# Patient Record
Sex: FEMALE | Race: ASIAN | NOT HISPANIC OR LATINO | Employment: FULL TIME | URBAN - METROPOLITAN AREA
[De-identification: names, ages, dates, MRNs, and addresses within clinical notes are randomized per-mention and may not be internally consistent; named-entity substitution may affect disease eponyms.]

---

## 2017-01-03 ENCOUNTER — COMMUNICATION - HEALTHEAST (OUTPATIENT)
Dept: FAMILY MEDICINE | Facility: CLINIC | Age: 27
End: 2017-01-03

## 2017-01-13 ENCOUNTER — COMMUNICATION - HEALTHEAST (OUTPATIENT)
Dept: FAMILY MEDICINE | Facility: CLINIC | Age: 27
End: 2017-01-13

## 2017-01-30 ENCOUNTER — COMMUNICATION - HEALTHEAST (OUTPATIENT)
Dept: FAMILY MEDICINE | Facility: CLINIC | Age: 27
End: 2017-01-30

## 2017-06-07 ENCOUNTER — OFFICE VISIT - HEALTHEAST (OUTPATIENT)
Dept: FAMILY MEDICINE | Facility: CLINIC | Age: 27
End: 2017-06-07

## 2017-06-07 DIAGNOSIS — J02.0 STREP PHARYNGITIS: ICD-10-CM

## 2017-06-07 DIAGNOSIS — J02.9 PHARYNGITIS: ICD-10-CM

## 2017-06-07 DIAGNOSIS — R05.9 COUGH: ICD-10-CM

## 2017-12-10 ENCOUNTER — COMMUNICATION - HEALTHEAST (OUTPATIENT)
Dept: FAMILY MEDICINE | Facility: CLINIC | Age: 27
End: 2017-12-10

## 2017-12-10 DIAGNOSIS — Z30.9 CONTRACEPTION MANAGEMENT: ICD-10-CM

## 2018-03-04 ENCOUNTER — COMMUNICATION - HEALTHEAST (OUTPATIENT)
Dept: FAMILY MEDICINE | Facility: CLINIC | Age: 28
End: 2018-03-04

## 2018-03-04 DIAGNOSIS — Z30.9 CONTRACEPTION MANAGEMENT: ICD-10-CM

## 2018-05-27 ENCOUNTER — COMMUNICATION - HEALTHEAST (OUTPATIENT)
Dept: FAMILY MEDICINE | Facility: CLINIC | Age: 28
End: 2018-05-27

## 2018-05-27 DIAGNOSIS — Z30.9 CONTRACEPTION MANAGEMENT: ICD-10-CM

## 2018-06-11 ENCOUNTER — OFFICE VISIT - HEALTHEAST (OUTPATIENT)
Dept: FAMILY MEDICINE | Facility: CLINIC | Age: 28
End: 2018-06-11

## 2018-06-11 DIAGNOSIS — Z30.41 ENCOUNTER FOR SURVEILLANCE OF CONTRACEPTIVE PILLS: ICD-10-CM

## 2018-06-11 DIAGNOSIS — Z12.4 SCREENING FOR CERVICAL CANCER: ICD-10-CM

## 2018-06-11 DIAGNOSIS — Z00.00 ROUTINE GENERAL MEDICAL EXAMINATION AT A HEALTH CARE FACILITY: ICD-10-CM

## 2018-06-11 ASSESSMENT — MIFFLIN-ST. JEOR: SCORE: 1220.2

## 2018-08-19 ENCOUNTER — COMMUNICATION - HEALTHEAST (OUTPATIENT)
Dept: FAMILY MEDICINE | Facility: CLINIC | Age: 28
End: 2018-08-19

## 2018-08-19 DIAGNOSIS — Z30.9 CONTRACEPTION MANAGEMENT: ICD-10-CM

## 2019-01-22 ENCOUNTER — COMMUNICATION - HEALTHEAST (OUTPATIENT)
Dept: SCHEDULING | Facility: CLINIC | Age: 29
End: 2019-01-22

## 2019-01-23 ENCOUNTER — OFFICE VISIT - HEALTHEAST (OUTPATIENT)
Dept: FAMILY MEDICINE | Facility: CLINIC | Age: 29
End: 2019-01-23

## 2019-01-23 DIAGNOSIS — L29.2 VULVAR ITCHING: ICD-10-CM

## 2019-01-23 LAB
CLUE CELLS: NORMAL
TRICHOMONAS, WET PREP: NORMAL
YEAST, WET PREP: NORMAL

## 2019-01-23 ASSESSMENT — MIFFLIN-ST. JEOR: SCORE: 1224.62

## 2019-01-26 ENCOUNTER — COMMUNICATION - HEALTHEAST (OUTPATIENT)
Dept: SCHEDULING | Facility: CLINIC | Age: 29
End: 2019-01-26

## 2019-01-28 ENCOUNTER — OFFICE VISIT - HEALTHEAST (OUTPATIENT)
Dept: FAMILY MEDICINE | Facility: CLINIC | Age: 29
End: 2019-01-28

## 2019-01-28 DIAGNOSIS — Z86.39 HISTORY OF THYROID DISEASE: ICD-10-CM

## 2019-01-28 DIAGNOSIS — R21 RASH AND NONSPECIFIC SKIN ERUPTION: ICD-10-CM

## 2019-01-28 LAB
ANION GAP SERPL CALCULATED.3IONS-SCNC: 9 MMOL/L (ref 5–18)
BUN SERPL-MCNC: 9 MG/DL (ref 8–22)
C REACTIVE PROTEIN LHE: 0.6 MG/DL (ref 0–0.8)
CALCIUM SERPL-MCNC: 9.7 MG/DL (ref 8.5–10.5)
CHLORIDE BLD-SCNC: 106 MMOL/L (ref 98–107)
CO2 SERPL-SCNC: 25 MMOL/L (ref 22–31)
CREAT SERPL-MCNC: 0.76 MG/DL (ref 0.6–1.1)
ERYTHROCYTE [DISTWIDTH] IN BLOOD BY AUTOMATED COUNT: 11.9 % (ref 11–14.5)
ERYTHROCYTE [SEDIMENTATION RATE] IN BLOOD BY WESTERGREN METHOD: 13 MM/HR (ref 0–20)
GFR SERPL CREATININE-BSD FRML MDRD: >60 ML/MIN/1.73M2
GLUCOSE BLD-MCNC: 83 MG/DL (ref 70–125)
HCT VFR BLD AUTO: 38.6 % (ref 35–47)
HGB BLD-MCNC: 12.4 G/DL (ref 12–16)
MCH RBC QN AUTO: 27.3 PG (ref 27–34)
MCHC RBC AUTO-ENTMCNC: 32.2 G/DL (ref 32–36)
MCV RBC AUTO: 85 FL (ref 80–100)
PLATELET # BLD AUTO: 261 THOU/UL (ref 140–440)
PMV BLD AUTO: 7.7 FL (ref 7–10)
POTASSIUM BLD-SCNC: 4 MMOL/L (ref 3.5–5)
RBC # BLD AUTO: 4.55 MILL/UL (ref 3.8–5.4)
SODIUM SERPL-SCNC: 140 MMOL/L (ref 136–145)
TSH SERPL DL<=0.005 MIU/L-ACNC: 1.64 UIU/ML (ref 0.3–5)
WBC: 5.4 THOU/UL (ref 4–11)

## 2019-01-28 ASSESSMENT — MIFFLIN-ST. JEOR: SCORE: 1208.29

## 2019-05-01 ENCOUNTER — COMMUNICATION - HEALTHEAST (OUTPATIENT)
Dept: FAMILY MEDICINE | Facility: CLINIC | Age: 29
End: 2019-05-01

## 2019-05-01 DIAGNOSIS — Z30.9 CONTRACEPTION MANAGEMENT: ICD-10-CM

## 2020-01-07 ENCOUNTER — COMMUNICATION - HEALTHEAST (OUTPATIENT)
Dept: FAMILY MEDICINE | Facility: CLINIC | Age: 30
End: 2020-01-07

## 2020-01-07 DIAGNOSIS — Z30.9 CONTRACEPTION MANAGEMENT: ICD-10-CM

## 2020-02-12 ENCOUNTER — OFFICE VISIT - HEALTHEAST (OUTPATIENT)
Dept: FAMILY MEDICINE | Facility: CLINIC | Age: 30
End: 2020-02-12

## 2020-02-12 DIAGNOSIS — Z30.41 ENCOUNTER FOR SURVEILLANCE OF CONTRACEPTIVE PILLS: ICD-10-CM

## 2020-02-12 DIAGNOSIS — Z00.00 ROUTINE GENERAL MEDICAL EXAMINATION AT A HEALTH CARE FACILITY: ICD-10-CM

## 2020-02-12 ASSESSMENT — MIFFLIN-ST. JEOR: SCORE: 1223.94

## 2020-03-25 ENCOUNTER — COMMUNICATION - HEALTHEAST (OUTPATIENT)
Dept: FAMILY MEDICINE | Facility: CLINIC | Age: 30
End: 2020-03-25

## 2020-03-25 DIAGNOSIS — Z30.9 CONTRACEPTION MANAGEMENT: ICD-10-CM

## 2021-05-20 ENCOUNTER — RECORDS - HEALTHEAST (OUTPATIENT)
Dept: FAMILY MEDICINE | Facility: CLINIC | Age: 31
End: 2021-05-20

## 2021-05-24 ENCOUNTER — RECORDS - HEALTHEAST (OUTPATIENT)
Dept: FAMILY MEDICINE | Facility: CLINIC | Age: 31
End: 2021-05-24

## 2021-05-27 ENCOUNTER — OFFICE VISIT - HEALTHEAST (OUTPATIENT)
Dept: FAMILY MEDICINE | Facility: CLINIC | Age: 31
End: 2021-05-27

## 2021-05-27 DIAGNOSIS — Z00.00 ROUTINE GENERAL MEDICAL EXAMINATION AT A HEALTH CARE FACILITY: ICD-10-CM

## 2021-05-27 DIAGNOSIS — Z12.4 SCREENING FOR MALIGNANT NEOPLASM OF CERVIX: ICD-10-CM

## 2021-05-27 DIAGNOSIS — Z30.41 ENCOUNTER FOR SURVEILLANCE OF CONTRACEPTIVE PILLS: ICD-10-CM

## 2021-05-27 ASSESSMENT — MIFFLIN-ST. JEOR: SCORE: 1214.18

## 2021-05-28 NOTE — TELEPHONE ENCOUNTER
Refill Approved    Rx renewed per Medication Renewal Policy. Medication was last renewed on 8/20/18.    Lilian Bach, [facility] Triage/Med Refill 5/1/2019     Requested Prescriptions   Pending Prescriptions Disp Refills     norgestimate-ethinyl estradiol (ORTHO TRI-CYCLEN) 0.18/0.215/0.25 mg-35 mcg (28) Tab tablet [Pharmacy Med Name: [facility] PK 28] 84 tablet 2     Sig: TAKE 1 TABLET DAILY (DUE FOR PHYSICAL)       Oral Contraceptives Protocol Passed - 5/1/2019  1:22 AM        Passed - Visit with PCP or prescribing provider visit in last 12 months      Last office visit with prescriber/PCP: 6/11/2018 Michelle Moctezuma MD OR same dept: 1/28/2019 Angelica Garcia CNP OR same specialty: 1/28/2019 Angelica Garcia CNP  Last physical: Visit date not found Last MTM visit: Visit date not found   Next visit within 3 mo: Visit date not found  Next physical within 3 mo: Visit date not found  Prescriber OR PCP: Michelle Moctezuma MD  Last diagnosis associated with med order: 1. Contraception management  - norgestimate-ethinyl estradiol (ORTHO TRI-CYCLEN) 0.18/0.215/0.25 mg-35 mcg (28) Tab tablet [Pharmacy Med Name: [facility] TR[facility]HAS[facility] PK 28]; TAKE 1 TABLET DAILY (DUE FOR PHYSICAL)  Dispense: 84 tablet; Refill: 2    If protocol passes may refill for 12 months if within 3 months of last provider visit (or a total of 15 months).

## 2021-05-31 VITALS — WEIGHT: 116.4 LBS | BODY MASS INDEX: 21.12 KG/M2

## 2021-06-01 VITALS — WEIGHT: 117.5 LBS | BODY MASS INDEX: 21.62 KG/M2 | HEIGHT: 62 IN

## 2021-06-02 VITALS — WEIGHT: 117.6 LBS | HEIGHT: 63 IN | BODY MASS INDEX: 20.84 KG/M2

## 2021-06-02 VITALS — WEIGHT: 114 LBS | BODY MASS INDEX: 20.2 KG/M2 | HEIGHT: 63 IN

## 2021-06-04 VITALS
BODY MASS INDEX: 21.68 KG/M2 | RESPIRATION RATE: 16 BRPM | HEART RATE: 72 BPM | DIASTOLIC BLOOD PRESSURE: 66 MMHG | SYSTOLIC BLOOD PRESSURE: 120 MMHG | HEIGHT: 62 IN | WEIGHT: 117.8 LBS

## 2021-06-05 NOTE — TELEPHONE ENCOUNTER
Left detailed message to call back and schedule a Px or medication check     Upon call back please help pt schedule a Px with MJ for refills of her OBC

## 2021-06-05 NOTE — TELEPHONE ENCOUNTER
Due to be seen    Rx renewed per Medication Renewal Policy. Medication was last renewed on 5/1/19.    Lilian Bach, [facility] Triage/Med Refill 1/8/2020     Requested Prescriptions   Pending Prescriptions Disp Refills     norgestimate-ethinyl estradiol (ORTHO TRI-CYCLEN) 0.18/0.215/0.25 mg-35 mcg (28) tablet [Pharmacy Med Name: [facility] TR[facility]HAS[facility] PK 28] 84 tablet 4     Sig: TAKE 1 TABLET DAILY (DUE FOR PHYSICAL)       Oral Contraceptives Protocol Passed - 1/7/2020 11:23 PM        Passed - Visit with PCP or prescribing provider visit in last 12 months      Last office visit with prescriber/PCP: 6/11/2018 Michelle Moctezuma MD OR same dept: 1/28/2019 Angelica Garcia CNP OR same specialty: 1/28/2019 Angelica Garcia CNP  Last physical: Visit date not found Last MTM visit: Visit date not found   Next visit within 3 mo: Visit date not found  Next physical within 3 mo: Visit date not found  Prescriber OR PCP: Michelle Moctezuma MD  Last diagnosis associated with med order: 1. Contraception management  - norgestimate-ethinyl estradiol (ORTHO TRI-CYCLEN) 0.18/0.215/0.25 mg-35 mcg (28) tablet [Pharmacy Med Name: [facility] PK 28]; TAKE 1 TABLET DAILY (DUE FOR PHYSICAL)  Dispense: 84 tablet; Refill: 4    If protocol passes may refill for 12 months if within 3 months of last provider visit (or a total of 15 months).

## 2021-06-05 NOTE — TELEPHONE ENCOUNTER
Left message to call back for: Pt to call back on mainline to schedule an appointment    Information to relay to patient:  Pt is due for a PHY or an OV to F/U on medication.  Please help the Pt schedule an appointment when she calls back

## 2021-06-06 NOTE — PROGRESS NOTES
Assessment/Plan:     1. Routine general medical examination at a health care facility  Routine history and physical, updated in EMR.  Fasting labs deferred for future visit.  Immunizations updated today.  Pap smear is up-to-date.  Plan repeat physical in 1 year.    2. Encounter for surveillance of contraceptive pills  Patient reports she is doing well on her current OCP and desires refills.  These will be refilled for 1 year when she needs them.      Subjective:      Rubi Mehta is a 29 y.o. female who presents for an annual exam. The patient is sexually active. The patient participates in regular exercise: yes. The patient reports that there is not domestic violence in her life.  Patient has been feeling well and has no particular questions or concerns today.  She does wish to continue her current OCP prescription.    Healthy Habits:   Regular Exercise: Yes  Sunscreen Use: Yes  Healthy Diet: Yes  Dental Visits Regularly: Yes  Seat Belt: Yes  Sexually active: Yes  Self Breast Exam Monthly:No  Lipid Profile: No  Glucose Screen: No      Immunization History   Administered Date(s) Administered     HPV Quadrivalent 02/06/2008, 06/18/2008, 07/09/2010     Hep A, historic 07/09/2007, 04/25/2016     Influenza, inj, historic,unspecified 12/19/2006, 09/27/2011, 10/17/2018     Tdap 10/03/2012     Immunization status: missing doses of flu, will do today.    No exam data present    Gynecologic History  Patient's last menstrual period was 02/08/2020.  Contraception: OCP (estrogen/progesterone)  Last Pap: 6/11/18. Results were: normal      OB History   No obstetric history on file.       Current Outpatient Medications   Medication Sig Dispense Refill     norgestimate-ethinyl estradiol (ORTHO TRI-CYCLEN) 0.18/0.215/0.25 mg-35 mcg (28) tablet TAKE 1 TABLET DAILY (DUE FOR PHYSICAL) 84 tablet 0     predniSONE (DELTASONE) 20 MG tablet Take 20 mg by mouth daily. 7 tablet 0     No current facility-administered medications for this  visit.      Past Medical History:   Diagnosis Date     Hyperthyroidism     in [place] treated with PTU for 4 years     Past Surgical History:   Procedure Laterality Date     WISDOM TOOTH EXTRACTION       Propylthiouracil  Family History   Problem Relation Age of Onset     Hypertension Mother      Colon cancer Father 75     Hypertension Maternal Aunt      Hypertension Maternal Uncle      Diabetes Maternal Grandmother      Hypertension Maternal Grandmother      Breast cancer Neg Hx      Cancer Neg Hx      Heart disease Neg Hx      Social History     Socioeconomic History     Marital status: Single     Spouse name: Not on file     Number of children: Not on file     Years of education: Not on file     Highest education level: Not on file   Occupational History     Not on file   Social Needs     Financial resource strain: Not on file     Food insecurity:     Worry: Not on file     Inability: Not on file     Transportation needs:     Medical: Not on file     Non-medical: Not on file   Tobacco Use     Smoking status: Never Smoker     Smokeless tobacco: Never Used   Substance and Sexual Activity     Alcohol use: Yes     Alcohol/week: 0.0 - 1.0 standard drinks     Drug use: Not on file     Sexual activity: Yes     Partners: Male     Birth control/protection: OCP     Comment:  Armando   Lifestyle     Physical activity:     Days per week: Not on file     Minutes per session: Not on file     Stress: Not on file   Relationships     Social connections:     Talks on phone: Not on file     Gets together: Not on file     Attends [employer] service: Not on file     Active member of club or organization: Not on file     Attends meetings of clubs or organizations: Not on file     Relationship status: Not on file     Intimate partner violence:     Fear of current or ex partner: Not on file     Emotionally abused: Not on file     Physically abused: Not on file     Forced sexual activity: Not on file   Other Topics Concern     Not on  "file   Social History Narrative     Not on file       Review of Systems  General:  Denies problem  Eyes: Denies problem  Ears/Nose/Throat: Denies problem  Cardiovascular: Denies problem  Respiratory:  Denies problem  Gastrointestinal:  Denies problem  Genitourinary: Denies problem  Musculoskeletal:  Denies problem  Skin: Denies problem  Neurologic: Denies problem  Psychiatric: Denies problem  Endocrine: Denies problem  Heme/Lymphatic: Denies problem   Allergic/Immunologic: Denies problem        Objective:         Vitals:    02/12/20 1424   BP: 120/66   Pulse: 72   Resp: 16   Weight: 117 lb 12.8 oz (53.4 kg)   Height: 5' 2.4\" (1.585 m)     Body mass index is 21.27 kg/m .    Physical Exam:  General Appearance: Alert, cooperative, no distress, appears stated age  Head: Normocephalic, without obvious abnormality, atraumatic  Eyes: PERRL, conjunctiva/corneas clear, EOM's intact  Ears: Normal TM's and external ear canals, both ears  Nose: Nares normal, septum midline, mucosa normal, no drainage  Throat: Lips, mucosa, and tongue normal; teeth and gums normal  Neck: Supple, symmetrical, trachea midline, no adenopathy; thyroid: not enlarged, symmetric, no tenderness/mass/nodules  Back: Symmetric, no curvature, ROM normal, no CVA tenderness  Lungs: Clear to auscultation bilaterally, respirations unlabored  Breasts: No breast masses, tenderness, asymmetry, or nipple discharge  Heart: Regular rate and rhythm, S1 and S2 normal, no murmur, rub, or gallop  Abdomen: Soft, non-tender, bowel sounds active all four quadrants, no masses, no organomegaly  Pelvic:Not examined  Extremities: Extremities normal, atraumatic, no cyanosis or edema  Skin: Skin color, texture, turgor normal, no rashes or lesions  Lymph nodes: Cervical, supraclavicular, and axillary nodes normal  Neurologic: Normal        "

## 2021-06-11 NOTE — PROGRESS NOTES
Chief Complaint   Patient presents with     Sore Throat     x 1 finished medication today had strep,penicillin 500 mg x2 for seven days for strep , still has a sore throat     Cough     x2 days ago , cough stuffy nose         History of Present Illness: Nursing notes reviewed. Patient was treated at her workplace clinic for strep throat, taking her last dose of PCN yesterday. Her cough is keeping her awake, and is new the past 5 days. She feels like she has something stuck her her throat when she swallows. She has a prior history of hyperthyroidism, with her heart rate being over 100 often when ill she states. No shortness of breath at exam. She has a history of childhood asthma. She was agreeable to me prescribing 3 more days of PCN, which I recommended to her. Her job involves working in a group environment.     Review of systems: See history of present illness, otherwise negative.     Current Outpatient Prescriptions   Medication Sig Dispense Refill     norgestimate-ethinyl estradiol (ORTHO TRI-CYCLEN) 0.18/0.215/0.25 mg-35 mcg (28) Tab tablet Take 1 tablet by mouth daily. 84 tablet 3     albuterol (PROAIR HFA;PROVENTIL HFA;VENTOLIN HFA) 90 mcg/actuation inhaler Inhale 2 puffs every 4 (four) hours as needed for wheezing or shortness of breath (cough). 1 Inhaler 0     benzonatate (TESSALON) 200 MG capsule Take 1 capsule (200 mg total) by mouth 3 (three) times a day as needed for cough. 20 capsule 0     etonogestrel-ethinyl estradiol (NUVARING) 0.12-0.015 mg/24 hr vaginal ring Insert 1 each into the vagina every 21 days. 1 each 11     penicillin VK (PEN VK) 500 MG tablet Take 1 tablet (500 mg total) by mouth 2 (two) times a day for 3 days. 6 tablet 0     No current facility-administered medications for this visit.        Past Medical History:   Diagnosis Date     Hyperthyroidism     in [place] treated with PTU for 4 years      Past Surgical History:   Procedure Laterality Date     WISDOM TOOTH EXTRACTION         Social History     Social History     Marital status: Single     Spouse name: N/A     Number of children: N/A     Years of education: N/A     Social History Main Topics     Smoking status: Never Smoker     Smokeless tobacco: None     Alcohol use 0.0 - 0.6 oz/week     0 - 1 Shots of liquor per week     Drug use: None     Sexual activity: Yes     Partners: Male     Birth control/ protection: OCP     Other Topics Concern     None     Social History Narrative       History   Smoking Status     Never Smoker   Smokeless Tobacco     Not on file      Exam:   Blood pressure 114/74, pulse (!) 105, temperature 99.3  F (37.4  C), temperature source Oral, resp. rate 16, weight 116 lb 6.4 oz (52.8 kg), last menstrual period 06/07/2017, SpO2 98 %, not currently breastfeeding.    EXAM:   General: Vital signs reviewed. Patient is in no acute appearing distress except for occasional cough, with a normal, pleasant affect. Breathing is non labored appearing. Patient is alert and oriented x 3.   ENT: Ear exam shows clear TMs with no injection, nasal turbinates are injected and edematous with increased clear rhinorrhea, mild pharyngeal injection noted. No aphthous ulcers, vesicles, or scabs noted on inner or outer oral exam.  Neck: supple with mildly tender adenopathy.  Heart: Normal rate and rhythm without murmur  Lungs: Clear to auscultation with good air flow bilaterally.  Skin: warm and dry  Recent Results (from the past 24 hour(s))   Mononucleosis Screen   Result Value Ref Range    Mono Screen Negative Negative   HM1 (CBC with Diff)   Result Value Ref Range    WBC 9.1 4.0 - 11.0 thou/uL    RBC 4.72 3.80 - 5.40 mill/uL    Hemoglobin 13.1 12.0 - 16.0 g/dL    Hematocrit 38.6 35.0 - 47.0 %    MCV 82 80 - 100 fL    MCH 27.6 27.0 - 34.0 pg    MCHC 33.8 32.0 - 36.0 g/dL    RDW 12.2 11.0 - 14.5 %    Platelets 370 140 - 440 thou/uL    MPV 6.7 (L) 7.0 - 10.0 fL    Neutrophils % 64 50 - 70 %    Lymphocytes % 27 20 - 40 %    Monocytes % 6 2 -  10 %    Eosinophils % 2 0 - 6 %    Basophils % 1 0 - 2 %    Neutrophils Absolute 5.9 2.0 - 7.7 thou/uL    Lymphocytes Absolute 2.4 0.8 - 4.4 thou/uL    Monocytes Absolute 0.6 0.0 - 0.9 thou/uL    Eosinophils Absolute 0.2 0.0 - 0.4 thou/uL    Basophils Absolute 0.1 0.0 - 0.2 thou/uL    Results from exam reviewed with patient.  I advised patient that treatment with albuterol may help relieve some of her coughing. I advised her that should could be experiencing an overlapping bacterial strep, and viral respiratory illness.  Assessment/Plan   1. Pharyngitis  HM1(CBC and Differential)    Mononucleosis Screen    HM1 (CBC with Diff)   2. Cough  HM1(CBC and Differential)    HM1 (CBC with Diff)    benzonatate (TESSALON) 200 MG capsule    albuterol (PROAIR HFA;PROVENTIL HFA;VENTOLIN HFA) 90 mcg/actuation inhaler   3. Strep pharyngitis         There are no Patient Instructions on file for this visit.   John Krause DO

## 2021-06-16 PROBLEM — Z30.41 ENCOUNTER FOR SURVEILLANCE OF CONTRACEPTIVE PILLS: Status: ACTIVE | Noted: 2018-06-17

## 2021-06-17 NOTE — TELEPHONE ENCOUNTER
RN cannot approve Refill Request    RN can NOT refill this medication PCP messaged that patient is overdue for Office Visit. Last office visit: 6/11/2018 Michelle Moctezuma MD Last Physical: 2/12/2020 Last MTM visit: Visit date not found Last visit same specialty: 1/28/2019 Angelica Garcia CNP.  Next visit within 3 mo: Visit date not found  Next physical within 3 mo: Visit date not found      Noris Alvarado, [facility] Triage/Med Refill 5/20/2021    Requested Prescriptions   Pending Prescriptions Disp Refills     norgestimate-ethinyl estradioL (ORTHO TRI-CYCLEN) 0.18/0.215/0.25 mg-35 mcg (28) tablet [Pharmacy Med Name: [facility] PK 28] 84 tablet 3     Sig: TAKE 1 TABLET DAILY       Oral Contraceptives Protocol Failed - 5/20/2021 12:08 AM        Failed - Visit with PCP or prescribing provider visit in last 12 months      Last office visit with prescriber/PCP: 6/11/2018 Michelle Moctezuma MD OR same dept: Visit date not found OR same specialty: 1/28/2019 Angelica Garcia CNP  Last physical: 2/12/2020 Last MTM visit: Visit date not found   Next visit within 3 mo: Visit date not found  Next physical within 3 mo: Visit date not found  Prescriber OR PCP: Michelle Moctezuma MD  Last diagnosis associated with med order: 1. Contraception management  - norgestimate-ethinyl estradioL (ORTHO TRI-CYCLEN) 0.18/0.215/0.25 mg-35 mcg (28) tablet [Pharmacy Med Name: [facility] PK 28]; TAKE 1 TABLET DAILY  Dispense: 84 tablet; Refill: 3    If protocol passes may refill for 12 months if within 3 months of last provider visit (or a total of 15 months).

## 2021-06-18 NOTE — PROGRESS NOTES
Assessment/Plan:     1. Routine general medical examination at a health care facility  Routine history and physical, updated in EMR.  Fasting labs deferred for a future visit.  Pap smear updated today.  Plan repeat physical in 1 year.    2. Encounter for surveillance of contraceptive pills  Doing well on current OCP.  Does not desire any changes.  Refills provided for 1 year.    3. Screening for cervical cancer  - Gynecologic Cytology (PAP Smear)      Subjective:      Rubi Mehta is a 27 y.o. female who presents for an annual exam. The patient is sexually active. The patient participates in regular exercise: yes. The patient reports that there is not domestic violence in her life.  Patient has been feeling well and has no particular questions or concerns today.  She would like a refill of her OCP.    Healthy Habits:   Regular Exercise: Yes  Sunscreen Use: Yes  Healthy Diet: Yes  Dental Visits Regularly: Yes  Seat Belt: Yes  Sexually active: Yes  Self Breast Exam Monthly:No  Lipid Profile: No  Glucose Screen: No  Guns at Home:  No      Immunization History   Administered Date(s) Administered     HPV Quadrivalent 02/06/2008, 06/18/2008, 07/09/2010     Hep A, historic 07/09/2007, 04/25/2016     Influenza, inj, historic,unspecified 12/19/2006, 09/27/2011     Tdap 10/03/2012     Immunization status: up to date and documented.    No exam data present    Gynecologic History  Patient's last menstrual period was 06/03/2018.  Contraception: OCP (estrogen/progesterone)  Last Pap: 5124-4650, unsure where. Results were: normal      OB History   No data available       Current Outpatient Prescriptions   Medication Sig Dispense Refill     norgestimate-ethinyl estradiol (ORTHO TRI-CYCLEN) 0.18/0.215/0.25 mg-35 mcg (28) Tab tablet TAKE 1 TABLET DAILY (DUE FOR PHYSICAL) 84 tablet 0     No current facility-administered medications for this visit.      Past Medical History:   Diagnosis Date     Hyperthyroidism     in [place] treated  "with PTU for 4 years     Past Surgical History:   Procedure Laterality Date     WISDOM TOOTH EXTRACTION       Propylthiouracil  Family History   Problem Relation Age of Onset     Hypertension Mother      Colon cancer Father 75     Hypertension Maternal Aunt      Hypertension Maternal Uncle      Diabetes Maternal Grandmother      Hypertension Maternal Grandmother      Breast cancer Neg Hx      Cancer Neg Hx      Heart disease Neg Hx      Social History     Social History     Marital status: Single     Spouse name: N/A     Number of children: N/A     Years of education: N/A     Occupational History     Not on file.     Social History Main Topics     Smoking status: Never Smoker     Smokeless tobacco: Never Used     Alcohol use 0.0 - 0.6 oz/week     0 - 1 Shots of liquor per week     Drug use: Not on file     Sexual activity: Yes     Partners: Male     Birth control/ protection: OCP     Other Topics Concern     Not on file     Social History Narrative       Review of Systems  General:  Denies problem  Eyes: Denies problem  Ears/Nose/Throat: Denies problem  Cardiovascular: Denies problem  Respiratory:  Denies problem  Gastrointestinal:  Denies problem  Genitourinary: Denies problem  Musculoskeletal:  Denies problem  Skin: Denies problem  Neurologic: Denies problem  Psychiatric: Denies problem  Endocrine: Denies problem  Heme/Lymphatic: Denies problem   Allergic/Immunologic: Denies problem        Objective:         Vitals:    06/11/18 1600   BP: 120/64   Pulse: 68   Resp: 16   Weight: 117 lb 8 oz (53.3 kg)   Height: 5' 2.25\" (1.581 m)     Body mass index is 21.32 kg/(m^2).    Physical Exam:  General Appearance: Alert, cooperative, no distress, appears stated age  Head: Normocephalic, without obvious abnormality, atraumatic  Eyes: PERRL, conjunctiva/corneas clear, EOM's intact  Ears: Normal TM's and external ear canals, both ears  Nose: Nares normal, septum midline, mucosa normal, no drainage  Throat: Lips, mucosa, and " tongue normal; teeth and gums normal  Neck: Supple, symmetrical, trachea midline, no adenopathy; thyroid: not enlarged, symmetric, no tenderness/mass/nodules  Back: Symmetric, no curvature, ROM normal, no CVA tenderness  Lungs: Clear to auscultation bilaterally, respirations unlabored  Breasts: No breast masses, tenderness, asymmetry, or nipple discharge  Heart: Regular rate and rhythm, S1 and S2 normal, no murmur, rub, or gallop  Abdomen: Soft, non-tender, bowel sounds active all four quadrants, no masses, no organomegaly  Pelvic: Normally developed genitalia with no external lesions or eruptions. Vagina and cervix show no lesions, inflammation, discharge or tenderness. No cystocele, No rectocele. Uterus normal to palpation.  No adnexal mass or tenderness.  Extremities: Extremities normal, atraumatic, no cyanosis or edema  Skin: Skin color, texture, turgor normal, no rashes or lesions  Lymph nodes: Cervical, supraclavicular, and axillary nodes normal  Neurologic: Normal

## 2021-06-23 NOTE — PROGRESS NOTES
Assessment/Plan:       1. Rash and nonspecific skin eruption  Unclear etiology of the rash.  She does have a history of hyperthyroidism I will check a thyroid cascade today.  In addition we will check her kidney function CBC and inflammatory markers.  I advised patient to let me know if symptoms are not improving.  In addition I did prescribe some prednisone today for her to take to help reduce the symptoms.  I encouraged her to continue utilizing the Benadryl and she may also find benefit from an H2 blocker such as Allegra, Claritin, or Zyrtec.  Recommend following up if rash is worsening or changing.  He is in agreement this plan.  - predniSONE (DELTASONE) 20 MG tablet; Take 20 mg by mouth daily.  Dispense: 7 tablet; Refill: 0  - Thyroid [place]  - Basic Metabolic Panel  - HM2(CBC w/o Differential)  - C-Reactive Protein(CRP)  - Sedimentation Rate    2. History of thyroid disease  We will check for abnormalities of her thyroid today given her history of parathyroidism.  She has had this treated in high school and has not required further treatments.  - Thyroid Cascade        Subjective:      Rubi Mehta is a 28 y.o. female who presents for concerns of rash on arms, legs, and abdomen that is itchy. This started 4 days ago. No known new exposure to soaps, lotion, laundry detergent, change in environment, or animals.   She has applied hydrocortisone without help. Benedryl does seem to help but only lasts about 4 hours. She initially thought that this was dry skin and had applied lotion, it has not improved. She was seen last week for external perineal itching. Wet prep was negative. She had an itchy Vulva. No other internal symptoms.  She is otherwise feeling well.  She denies any chest pain, shortness of breath, or difficulty breathing.  She has not had any nausea or vomiting.  She denies any upper respiratory tract infections.  She denies any history of similar rash or symptoms.    The following portions of  "the patient's history were reviewed and updated as appropriate: allergies, current medications and problem list.    Review of Systems   Pertinent items are noted in HPI.      Objective:      /78   Pulse 68   Resp 16   Ht 5' 2.5\" (1.588 m)   Wt 114 lb (51.7 kg)   LMP 01/14/2019 (Exact Date)   BMI 20.52 kg/m      General appearance: alert, appears stated age and cooperative  Head: Normocephalic, without obvious abnormality, atraumatic  Lungs: clear to auscultation bilaterally  Heart: regular rate and rhythm, S1, S2 normal, no murmur, click, rub or gallop  Extremities: extremities normal, atraumatic, no cyanosis or edema  Pulses: 2+ and symmetric  Neurologic: Grossly normal   Skin: Maculopapular scattered rash present on upper and lower extremities the rash is small papular that is scattered.  It appears that the rash appears after she itches.  Rash is similar to a drug type reaction rash versus a viral exanthem.  "

## 2021-06-23 NOTE — TELEPHONE ENCOUNTER
"Caller states she has itchy skin around\" vagina and around the urethra and in the folds\". No discharge noted. This began 3 days ago. She has changed the soap she uses, and it didn't help.     Reason for Disposition    Mild vulvar itching    Protocols used: VULVAR SYMPTOMS-A-AH    Triaged to a disposition of Home care, see provider if sx do not respond to home care within 3 days.  Caller transferred to  per her request.     Nathalie Orozco RN [facility] Triage Nurse  "

## 2021-06-23 NOTE — TELEPHONE ENCOUNTER
"Patient called and states she was seen Wednesday for vaginal itching  -Wet prep was neg    However now her whole body is itching, arms and legs are worse. Initially there was no rash, but since scratching now is developing red bumps.     Feels like a \"normal itch that won't go away, but it is everywhere\". Keeps her up at night. Discomfort rated at 8/10    Has been applying lotion, taking benadryl     Nothing (lotion, soap, food, etc) has changed    No fever  No sores or scratch marks  No difficulty breathing or swallowing  Not pregnant  LMP 1/14/19    Rash:   Small red dots everywhere she has itched.   Do not janae  Not blistered  No stiff neck    Reason for Disposition    [1] MODERATE-SEVERE widespread itching (i.e., interferes with sleep, normal activities or school) AND [2] not improved after 24 hours of itching Care Advice    [1] Itching of unknown cause AND [2] present < 48 hours    [1] Purple or blood-colored rash (spots or dots) AND [2] no fever AND [3] sounds well to triager    Protocols used: ITCHING - WIDESPREAD-A-AH, RASH OR REDNESS - WIDESPREAD-A-AH    Triaged to a disposition of See a physician within 4 hours. Patient states she will wait until tomorrow to be seen at [facility]. Care advice given    Dina Aldridge RN [facility]    "

## 2021-06-23 NOTE — PROGRESS NOTES
"Assessment / Impression     1. Vulvar itching  Wet Prep, Vaginal       Plan:     1.  Reviewed with patient her normal wet prep results.  We discussed common causes of vulvar itching.  She is going to remove all possible offending agents.  If she has persistent symptoms she will try over-the-counter Monistat externally only.  We also discussed a short course of over-the-counter hydrocortisone for symptomatic relief.  If she has persistent symptoms despite these measures encouraged return for evaluation in the next 2 weeks.    Subjective:      HPI: Rubi Mehta is a 28 y.o. female with vulvar itching is here today for evaluation.  Patient reports proximate 3 days ago she experienced some itching on the external vaginal area.  She describes an itching sensation throughout all of the vaginal folds.  She denies any symptoms internally.  She reports she might notice a slight increase in vaginal discharge.  She reports she has had no new exposures to new soaps or detergents.  She is never had anything like this before.  She is monogamous with one sexual partner and this is not a new partner.  She denies any other allergies or skin conditions.  She denies any vaginal pain or abnormal bleeding.  She is on the birth control pill for contraception and had a normal last menstrual period on January 14.  She has not tried anything for symptoms.  She reports her symptoms are worse in the morning and at night.  She has no other questions or concerns.      Review of Systems  Pertinent items are noted in HPI.       Objective:     /78 (Patient Site: Left Arm, Patient Position: Sitting, Cuff Size: Adult Regular)   Pulse 84   Ht 5' 2.5\" (1.588 m)   Wt 117 lb 9.6 oz (53.3 kg)   LMP 01/14/2019 (Exact Date)   Breastfeeding? No   BMI 21.17 kg/m    Physical Examination: General appearance - alert, well appearing, and in no distress  : Normal external genitalia.  No erythema or evidence of excoriation.  No lesions or rashes " noted.  Wet prep analysis obtained from vaginal mucosa just inside the introitus.  Wet prep is negative/normal.

## 2021-06-25 NOTE — PROGRESS NOTES
Assessment/Plan:     1. Routine general medical examination at a health care facility  Routine history and physical, updated in EMR.  Fasting labs deferred for future visit.  Pap smear updated today.  Immunizations are up-to-date.  Plan repeat physical in 1 year.    2. Encounter for surveillance of contraceptive pills  Patient continues to enjoy this method of contraception.  Refills provided today.  - norgestimate-ethinyl estradioL (ORTHO TRI-CYCLEN) 0.18/0.215/0.25 mg-35 mcg (28) tablet; Take 1 tablet by mouth daily.  Dispense: 84 tablet; Refill: 3    3. Screening for malignant neoplasm of cervix  Routine screening Pap smear updated today.  - Gynecologic Cytology (PAP Smear)      Subjective:      Rubi Mehta is a 30 y.o. female who presents for an annual exam. The patient is sexually active. The patient participates in regular exercise: yes. The patient reports that there is not domestic violence in her life.    1. Seasonal allergies.  Occasionally takes a nondrowsy antihistamine.  Discussed trial of nasal spray.  2. Has very few moles on her skin.   has many moles, has had several biopsies.  Patient is wondering if she should be seeing dermatology yearly.  Discussed that she is welcome to check with her insurance.  No concerning pigmented lesions on limited exam today.  Discussed that likely she does not need a referral to see dermatology, if she does, she can let me know.    Healthy Habits:   Regular Exercise: Yes  Sunscreen Use: Yes  Healthy Diet: Yes  Dental Visits Regularly: Yes  Seat Belt: Yes  Sexually active: Yes  Self Breast Exam Monthly:No  Lipid Profile: No  Glucose Screen: No      Immunization History   Administered Date(s) Administered     Covid-19,PF,Cherelle 03/09/2021     HPV Quadrivalent 02/06/2008, 06/18/2008, 07/09/2010     Hep A, historic 07/09/2007, 04/25/2016     INFLUENZA,SEASONAL QUAD, PF, =/> 6months 02/12/2020     Influenza, inj, historic,unspecified 12/19/2006, 09/27/2011,  10/17/2018     Tdap 10/03/2012     Immunization status: up to date and documented.    No exam data present    Gynecologic History  Patient's last menstrual period was 05/06/2021.  Contraception: OCP (estrogen/progesterone)  Last Pap: 6/11/18. Results were: normal      OB History   No obstetric history on file.       Current Outpatient Medications   Medication Sig Dispense Refill     norgestimate-ethinyl estradioL (ORTHO TRI-CYCLEN) 0.18/0.215/0.25 mg-35 mcg (28) tablet Take 1 tablet by mouth daily. 84 tablet 3     No current facility-administered medications for this visit.      Past Medical History:   Diagnosis Date     [place]     [place] treated with PTU for 4 years     Past Surgical History:   Procedure Laterality Date     WISDOM TOOTH EXTRACTION       Propylthiouracil  Family History   Problem Relation Age of Onset     Hypertension Mother      Colon cancer Father 75     Hypertension Maternal Aunt      Hypertension Maternal Uncle      Diabetes Maternal Grandmother      Hypertension Maternal Grandmother      Breast cancer Neg Hx      Cancer Neg Hx      Heart disease Neg Hx      Social History     Socioeconomic History     Marital status: Single     Spouse name: Not on file     Number of children: Not on file     Years of education: Not on file     Highest education level: Not on file   Occupational History     Not on file   Social Needs     Financial resource strain: Not on file     Food insecurity     Worry: Not on file     Inability: Not on file     Transportation needs     Medical: Not on file     Non-medical: Not on file   Tobacco Use     Smoking status: Never Smoker     Smokeless tobacco: Never Used   Substance and Sexual Activity     Alcohol use: Yes     Alcohol/week: 0.0 - 1.0 standard drinks     Drug use: Never     Sexual activity: Yes     Partners: Male     Birth control/protection: OCP     Comment:  Armando   Lifestyle     Physical activity     Days per week: Not on file     Minutes per  "session: Not on file     Stress: Not on file   Relationships     Social connections     Talks on phone: Not on file     Gets together: Not on file     Attends Evangelical service: Not on file     Active member of club or organization: Not on file     Attends meetings of clubs or organizations: Not on file     Relationship status: Not on file     Intimate partner violence     Fear of current or ex partner: Not on file     Emotionally abused: Not on file     Physically abused: Not on file     Forced sexual activity: Not on file   Other Topics Concern     Not on file   Social History Narrative     Not on file       Review of Systems  General:  Denies problem  Eyes: Denies problem  Ears/Nose/Throat: Denies problem  Cardiovascular: Denies problem  Respiratory:  Denies problem  Gastrointestinal:  Denies problem  Genitourinary: Denies problem  Musculoskeletal:  Denies problem  Skin: Denies problem  Neurologic: Denies problem  Psychiatric: Denies problem  Endocrine: Denies problem  Heme/Lymphatic: Denies problem   Allergic/Immunologic: Seasonal allergies      Objective:         Vitals:    05/27/21 1447   BP: 108/71   Pulse: 75   Weight: 116 lb 6.4 oz (52.8 kg)   Height: 5' 2.5\" (1.588 m)     Body mass index is 20.95 kg/m .    Physical Exam:  General Appearance: Alert, cooperative, no distress, appears stated age  Head: Normocephalic, without obvious abnormality, atraumatic  Eyes: PERRL, conjunctiva/corneas clear, EOM's intact  Ears: Normal TM's and external ear canals, both ears  Nose: Nares normal, septum midline, mucosa normal, no drainage  Throat: Lips, mucosa, and tongue normal; teeth and gums normal  Neck: Supple, symmetrical, trachea midline, no adenopathy; thyroid: not enlarged, symmetric, no tenderness/mass/nodules  Back: Symmetric, no curvature, ROM normal, no CVA tenderness  Lungs: Clear to auscultation bilaterally, respirations unlabored  Breasts: No breast masses, tenderness, asymmetry, or nipple discharge  Heart: " Regular rate and rhythm, S1 and S2 normal, no murmur, rub, or gallop  Abdomen: Soft, non-tender, bowel sounds active all four quadrants, no masses, no organomegaly  Pelvic:Normally developed genitalia with no external lesions or eruptions. Vagina and cervix show no lesions, inflammation, discharge or tenderness. No cystocele, No rectocele. Uterus normal to palpation.  No adnexal mass or tenderness.  Extremities: Extremities normal, atraumatic, no cyanosis or edema  Skin: Skin color, texture, turgor normal, no rashes or lesions  Lymph nodes: Cervical, supraclavicular, and axillary nodes normal  Neurologic: Normal

## 2021-07-06 VITALS
SYSTOLIC BLOOD PRESSURE: 108 MMHG | WEIGHT: 116.4 LBS | HEIGHT: 63 IN | DIASTOLIC BLOOD PRESSURE: 71 MMHG | BODY MASS INDEX: 20.62 KG/M2 | HEART RATE: 75 BPM

## 2021-09-05 NOTE — TELEPHONE ENCOUNTER
Patient was seen yesterday 05.27.21 with Dr Moctezuma for her physical.    Attending Attestation (For Attendings USE Only)...

## 2021-10-02 ENCOUNTER — HEALTH MAINTENANCE LETTER (OUTPATIENT)
Age: 31
End: 2021-10-02

## 2021-10-19 ENCOUNTER — OFFICE VISIT (OUTPATIENT)
Dept: FAMILY MEDICINE | Facility: CLINIC | Age: 31
End: 2021-10-19
Payer: COMMERCIAL

## 2021-10-19 VITALS
DIASTOLIC BLOOD PRESSURE: 84 MMHG | OXYGEN SATURATION: 98 % | RESPIRATION RATE: 16 BRPM | HEART RATE: 82 BPM | SYSTOLIC BLOOD PRESSURE: 123 MMHG

## 2021-10-19 DIAGNOSIS — M54.50 ACUTE BILATERAL LOW BACK PAIN WITHOUT SCIATICA: Primary | ICD-10-CM

## 2021-10-19 PROCEDURE — 99213 OFFICE O/P EST LOW 20 MIN: CPT | Performed by: PHYSICIAN ASSISTANT

## 2021-10-19 RX ORDER — CYCLOBENZAPRINE HCL 5 MG
5 TABLET ORAL
Qty: 30 TABLET | Refills: 0 | Status: SHIPPED | OUTPATIENT
Start: 2021-10-19 | End: 2021-10-26

## 2021-10-19 RX ORDER — NORGESTIMATE AND ETHINYL ESTRADIOL 7DAYSX3 28
1 KIT ORAL
COMMUNITY
Start: 2021-05-27 | End: 2022-04-24

## 2021-10-19 NOTE — PROGRESS NOTES
URGENT CARE VISIT:    SUBJECTIVE:   Rubi Mehta is a 31 year old female who presents for evaluation of back pain  Symptoms began 2 day(s) ago, have been onset acute and are worse.  Pain is located in the low back bilateral region, without radiation. Pain is exacerbated by: bending.  Pain is relieved by: standing. Associated symptoms include: none. Denies any fever, unintentional weight loss,bladder urgency, bladder incontinence and bowel incontinence. Recent injury: she sneezed and felt immediate back pain.  Personal hx of back pain is no prior back problems.     PMH:   Past Medical History:   Diagnosis Date     Hyperthyroidism      [place]thy[place]     [place] treated with PTU for 4 years     Allergies: Other [no clinical screening - see comments]  Medications:   Current Outpatient Medications   Medication Sig Dispense Refill     cyclobenzaprine (FLEXERIL) 5 MG tablet Take 1 tablet (5 mg) by mouth at bedtime as needed, may repeat once for muscle spasms 30 tablet 0     norgestim-eth estrad triphasic (ORTHO TRI-CYCLEN) 0.18/0.215/0.25 MG-35 MCG tablet Take 1 tablet by mouth       norelgestromin-ethinyl estradiol (ORTHO EVRA) 150-20 MCG/24HR patch Place 1 patch onto the skin once a week. (Patient not taking: Reported on 10/19/2021)       Social History:   Social History     Tobacco Use     Smoking status: Never Smoker     Smokeless tobacco: Never Used   Substance Use Topics     Alcohol use: Yes     Alcohol/week: 0.0 - 1.0 standard drinks     Comment: almost never       ROS: ROS otherwise found to be negative except as noted above.     OBJECTIVE:  /84   Pulse 82   Resp 16   SpO2 98%   General: WDWN in NAD.   Cardiac: RRR without murmurs, rubs, or gallops.  Respiratory: LCTAB without adventitious sounds. Non-labored breathing.  Musculoskeletal: Ambulating without difficulty. Back symmetric, no curvature. ROM normal. Pain when she returns to straight from forward flexion. No CVA tenderness.. Tender to  palpation over paralumbar spine. Straight leg raise test: negative  Neurological: Normal strength and tone with no weakness or sensory deficit noted, reflexes normal.     ASSESSMENT:    ICD-10-CM    1. Acute bilateral low back pain without sciatica  M54.50 cyclobenzaprine (FLEXERIL) 5 MG tablet         PLAN:  Patient Instructions     Patient was educated on the natural course of injury which usually resolves within a few weeks.  Conservative measures discussed including rest, ice for 48 hours then heat after, exercises, and over-the-counter analgesics (Tylenol or Ibuprofen). See your primary care provider if symptoms worsen or do not improve in 7 days.  Seek emergency care if you develop severe pain, weakness, or changes in bowel/bladder habits.     Patient verbalized understanding and is agreeable to plan. The patient was discharged ambulatory and in stable condition.    Jina Vale PA-C ....................  10/19/2021   10:46 AM

## 2021-10-19 NOTE — PATIENT INSTRUCTIONS
Patient was educated on the natural course of injury which usually resolves within a few weeks.  Conservative measures discussed including rest, ice for 48 hours then heat after, exercises, and over-the-counter analgesics (Tylenol or Ibuprofen). See your primary care provider if symptoms worsen or do not improve in 7 days.  Seek emergency care if you develop severe pain, weakness, or changes in bowel/bladder habits.       Self-Care for Back Pain    Principles to encourage healing of musculoskeletal back pain:  1) Reduce Strain  2) Practice Relaxation  3) Good Posture  4) Progressive Stretching  5) Get a good night's sleep    Avoid muscle tensing habits and activities that put strain on the back.  Remind yourself regularly to determine if you are doing any tensing habits. Use reminders methods such as stickers or timers.  If noticed, replace these negative habits with a positive habit of having your head up, chest up, chin in, and shoulders down and back.       Back tensing    Sleeping on your stomach    Tensing or twist the back often    Sitting for hours at a time most days    Lifting objects by bending at the waist   Slouching while standing or walking    Slouching forward while sitting in a chair    Carrying heavy objects while bent at waist     Twisting the back to one side when working     Avoid tensing and poor posture while driving     Avoid events or activities that trigger the pain.  Use a pain diary to review daily activities that aggravate the pain and change your behavior.    Practice relaxation and deep breathing  Lay down on your back and take a deep breath from your abdomen up. Let it out slowly as you allow your back, neck and shoulders to relax.  This can calm you, reduce stress, and decrease muscle tensing.     Good Posture  Sitting:  Keep your butt to the back of the chair and sit with your shoulders and head up. This will help reduce strain to the back muscles.  Closely monitor your back position  (under your shoulders) to maintain balanced and relaxed head, neck, shoulder and back muscles.  Support your mid back with a rolled towel and lean back so that your the shoulders fall back, your neck is relaxed, and your head is up.    Lifting:  Use the squat lift for heavy objects. Start with the object between your legs.  Squat down, keep your head up, shoulders back, and spine erect.  Bend at the hips, knees, and ankles as you lift.   Use the golfer's lift for light objects by using some support (a chair, a desk, or a putter).  Put your hand on the support to take the load off your back as you bend over.      Progressive Stretching.  Stretch the back and hip muscles and joints.  One option would be to use sun salutation yoga exercises.   Touch the toes, reach above, one leg out, other leg, butt up, butt down, other leg up, and stretch.  This routine will stretch tight paraspinous, hamstrings, hip, and quadratus lumborum muscles.  Gently stretch 4 rotations for 2 times per day to loosen up tight backs.     Get a good night s sleep with a good semi-firm mattress.  Avoid caffeinated beverages (coffee, tea, and soft drinks) later in the day.  Improve your sleep environment.  Reduce light and noise and lie on a comfortable mattress.  Reduce stimulating activities in the late evening including computer work and exercising.  Avoid sleeping on your stomach.     Apply heat or cold and massage tender muscles.   Heat or ice applications used up to four times per day can relax the muscles and reduce pain. For heat, microwave a wet towel for approximately 1 minute or until towel is warm and wrap around a hot-water bottle or heated gel pack and apply for 15 to 20 minutes. For cold, use ice wrapped in a thin cloth on the area until you first feel some numbness. Use what feels best. Heat is often used for more chronic pain conditions and cold for acute conditions. Massage tender points in the muscles    Use anti-inflammatory and  pain-reducing medications.  Short-term and occasional use of over-the-counter ibuprofen, naproxen, acetaminophen, or aspirin (without caffeine) can reduce joint and muscle pain.  Be sure to check with your provider to be sure this would be safe.  Please note that extended daily use can sometimes cause rebound pain and an extend your symptoms.

## 2022-04-21 DIAGNOSIS — Z30.41 ENCOUNTER FOR SURVEILLANCE OF CONTRACEPTIVE PILLS: Primary | ICD-10-CM

## 2022-04-24 RX ORDER — NORGESTIMATE AND ETHINYL ESTRADIOL 7DAYSX3 28
KIT ORAL
Qty: 84 TABLET | Refills: 0 | Status: SHIPPED | OUTPATIENT
Start: 2022-04-24 | End: 2022-07-14

## 2022-04-24 NOTE — TELEPHONE ENCOUNTER
"Outpatient Medication Detail     Disp Refills Start End YOUSIF   norgestimate-ethinyl estradioL (ORTHO TRI-CYCLEN) 0.18/0.215/0.25 mg-35 mcg (28) tablet 84 tablet 3 5/27/2021  No   Sig - Route: Take 1 tablet by mouth daily. - Oral   Sent to pharmacy as: norgestimate-ethinyl estradioL 0.18 mg/0.215mg/0.25mg-35 mcg(28)tablet (ORTHO TRI-CYCLEN)   E-Prescribing Status: Receipt confirmed by pharmacy (5/27/2021  2:55 PM CDT)     Last office visit provider:  5/27/21     Requested Prescriptions   Pending Prescriptions Disp Refills     norgestim-eth estrad triphasic (ORTHO TRI-CYCLEN) 0.18/0.215/0.25 MG-35 MCG tablet [Pharmacy Med Name: [facility]GE[facility]/E ES TRIPHASIC PK 28 35MCG] 84 tablet 3     Sig: TAKE 1 TABLET DAILY       Contraceptives Protocol Passed - 4/24/2022  9:17 AM        Passed - Patient is not a current smoker if age is 35 or older        Passed - Recent (12 mo) or future (30 days) visit within the authorizing provider's specialty     Patient has had an office visit with the authorizing provider or a provider within the authorizing providers department within the previous 12 mos or has a future within next 30 days. See \"Patient Info\" tab in inbasket, or \"Choose Columns\" in Meds & Orders section of the refill encounter.              Passed - Medication is active on med list        Passed - No active pregnancy on record        Passed - No positive pregnancy test in past 12 months             Manny Vázquez RN 04/24/22 9:17 AM  "

## 2022-07-03 ENCOUNTER — HEALTH MAINTENANCE LETTER (OUTPATIENT)
Age: 32
End: 2022-07-03

## 2022-07-14 DIAGNOSIS — Z30.41 ENCOUNTER FOR SURVEILLANCE OF CONTRACEPTIVE PILLS: ICD-10-CM

## 2022-07-14 RX ORDER — NORGESTIMATE AND ETHINYL ESTRADIOL 7DAYSX3 28
KIT ORAL
Qty: 28 TABLET | Refills: 0 | Status: SHIPPED | OUTPATIENT
Start: 2022-07-14 | End: 2022-08-11

## 2022-07-14 NOTE — TELEPHONE ENCOUNTER
"Last Written Prescription Date:  4/24/22  Last Fill Quantity: 84,  # refills: 0   Last office visit provider:  5/27/21     Requested Prescriptions   Pending Prescriptions Disp Refills     norgestim-eth estrad triphasic (ORTHO TRI-CYCLEN) 0.18/0.215/0.25 MG-35 MCG tablet [Pharmacy Med Name: [facility]/E ES TRIPHASIC PK 28 35MCG] 84 tablet 3     Sig: TAKE 1 TABLET DAILY       Contraceptives Protocol Passed - 7/14/2022 12:06 AM        Passed - Patient is not a current smoker if age is 35 or older        Passed - Recent (12 mo) or future (30 days) visit within the authorizing provider's specialty     Patient has had an office visit with the authorizing provider or a provider within the authorizing providers department within the previous 12 mos or has a future within next 30 days. See \"Patient Info\" tab in inbasket, or \"Choose Columns\" in Meds & Orders section of the refill encounter.              Passed - Medication is active on med list        Passed - No active pregnancy on record        Passed - No positive pregnancy test in past 12 months             Lilian Bach, RN 07/14/22 12:15 PM  "

## 2022-08-11 ENCOUNTER — OFFICE VISIT (OUTPATIENT)
Dept: FAMILY MEDICINE | Facility: CLINIC | Age: 32
End: 2022-08-11
Payer: COMMERCIAL

## 2022-08-11 VITALS
BODY MASS INDEX: 22.86 KG/M2 | HEART RATE: 74 BPM | HEIGHT: 62 IN | WEIGHT: 124.2 LBS | SYSTOLIC BLOOD PRESSURE: 113 MMHG | RESPIRATION RATE: 16 BRPM | DIASTOLIC BLOOD PRESSURE: 74 MMHG

## 2022-08-11 DIAGNOSIS — Z11.59 NEED FOR HEPATITIS C SCREENING TEST: ICD-10-CM

## 2022-08-11 DIAGNOSIS — Z30.41 ENCOUNTER FOR SURVEILLANCE OF CONTRACEPTIVE PILLS: ICD-10-CM

## 2022-08-11 DIAGNOSIS — Z00.00 ROUTINE GENERAL MEDICAL EXAMINATION AT A HEALTH CARE FACILITY: Primary | ICD-10-CM

## 2022-08-11 DIAGNOSIS — Z13.220 LIPID SCREENING: ICD-10-CM

## 2022-08-11 DIAGNOSIS — Z86.39 HISTORY OF GRAVES' DISEASE: ICD-10-CM

## 2022-08-11 DIAGNOSIS — Z11.4 SCREENING FOR HIV (HUMAN IMMUNODEFICIENCY VIRUS): ICD-10-CM

## 2022-08-11 LAB
CHOLEST SERPL-MCNC: 151 MG/DL
HDLC SERPL-MCNC: 62 MG/DL
LDLC SERPL CALC-MCNC: 51 MG/DL
NONHDLC SERPL-MCNC: 89 MG/DL
TRIGL SERPL-MCNC: 190 MG/DL
TSH SERPL DL<=0.005 MIU/L-ACNC: 2.07 UIU/ML (ref 0.3–4.2)

## 2022-08-11 PROCEDURE — 80061 LIPID PANEL: CPT | Performed by: FAMILY MEDICINE

## 2022-08-11 PROCEDURE — 36415 COLL VENOUS BLD VENIPUNCTURE: CPT | Performed by: FAMILY MEDICINE

## 2022-08-11 PROCEDURE — 87389 HIV-1 AG W/HIV-1&-2 AB AG IA: CPT | Performed by: FAMILY MEDICINE

## 2022-08-11 PROCEDURE — 84443 ASSAY THYROID STIM HORMONE: CPT | Performed by: FAMILY MEDICINE

## 2022-08-11 PROCEDURE — 86803 HEPATITIS C AB TEST: CPT | Performed by: FAMILY MEDICINE

## 2022-08-11 PROCEDURE — 99395 PREV VISIT EST AGE 18-39: CPT | Performed by: FAMILY MEDICINE

## 2022-08-11 RX ORDER — NORGESTIMATE AND ETHINYL ESTRADIOL 7DAYSX3 28
1 KIT ORAL DAILY
Qty: 84 TABLET | Refills: 3 | Status: SHIPPED | OUTPATIENT
Start: 2022-08-11 | End: 2023-02-07

## 2022-08-11 ASSESSMENT — ENCOUNTER SYMPTOMS
DIZZINESS: 0
BREAST MASS: 0
CONSTIPATION: 0
HEMATURIA: 0
PARESTHESIAS: 0
JOINT SWELLING: 0
ABDOMINAL PAIN: 0
CHILLS: 0
HEMATOCHEZIA: 0
HEADACHES: 0
SORE THROAT: 0
DIARRHEA: 0
FREQUENCY: 0
HEARTBURN: 0
NERVOUS/ANXIOUS: 0
ARTHRALGIAS: 0
SHORTNESS OF BREATH: 0
COUGH: 0
WEAKNESS: 0
NAUSEA: 0
FEVER: 0
MYALGIAS: 0
EYE PAIN: 0
DYSURIA: 0
PALPITATIONS: 0

## 2022-08-11 NOTE — PROGRESS NOTES
SUBJECTIVE:   CC: Rubi Mehta is an 32 year old woman who presents for preventive health visit.     Patient has been advised of split billing requirements and indicates understanding: Yes     Healthy Habits:     Getting at least 3 servings of Calcium per day:  Yes    Bi-annual eye exam:  NO    Dental care twice a year:  Yes    Sleep apnea or symptoms of sleep apnea:  None    Diet:  Regular (no restrictions)    Frequency of exercise:  2-3 days/week    Duration of exercise:  Less than 15 minutes    Taking medications regularly:  Yes    Medication side effects:  Not applicable    PHQ-2 Total Score: 0    Additional concerns today:  No    Today's PHQ-2 Score:   PHQ-2 ( 1999 [employer]) 8/11/2022   Q1: Little interest or pleasure in doing things 0   Q2: Feeling down, depressed or hopeless 0   PHQ-2 Score 0   Q1: Little interest or pleasure in doing things Not at all   Q2: Feeling down, depressed or hopeless Not at all   PHQ-2 Score 0       Abuse: Current or Past (Physical, Sexual or Emotional) - No  Do you feel safe in your environment? Yes    Have you ever done Advance Care Planning? (For example, a Health Directive, POLST, or a discussion with a medical provider or your loved ones about your wishes): No, advance care planning information given to patient to review.  Patient plans to discuss their wishes with loved ones or provider.      Social History     Tobacco Use     Smoking status: Never Smoker     Smokeless tobacco: Never Used   Substance Use Topics     Alcohol use: Yes     Alcohol/week: 0.0 - 1.0 standard drinks     Comment: almost never     If you drink alcohol do you typically have >3 drinks per day or >7 drinks per week? No    Alcohol Use 8/11/2022   Prescreen: >3 drinks/day or >7 drinks/week? No   Prescreen: >3 drinks/day or >7 drinks/week? -       Reviewed orders with patient.  Reviewed health maintenance and updated orders accordingly - Yes    BP Readings from Last 3 Encounters:   08/11/22 113/74    10/19/21 123/84   05/27/21 108/71    Wt Readings from Last 3 Encounters:   08/11/22 56.3 kg (124 lb 3.2 oz)   05/27/21 52.8 kg (116 lb 6.4 oz)   02/12/20 53.4 kg (117 lb 12.8 oz)                  Patient Active Problem List   Diagnosis     Encounter for surveillance of contraceptive pills     Past Surgical History:   Procedure Laterality Date     WISDOM TOOTH EXTRACTION         Social History     Tobacco Use     Smoking status: Never Smoker     Smokeless tobacco: Never Used   Substance Use Topics     Alcohol use: Yes     Alcohol/week: 0.0 - 1.0 standard drinks     Comment: almost never     Family History   Problem Relation Age of Onset     Hypertension Mother      Colon Cancer Father 75     Hypertension Maternal Aunt      Hypertension Maternal Uncle      Diabetes Maternal Grandmother      Hypertension Maternal Grandmother      Breast Cancer No family hx of      Cancer No family hx of      Heart Disease No family hx of          Current Outpatient Medications   Medication Sig Dispense Refill     norgestim-eth estrad triphasic (ORTHO TRI-CYCLEN) 0.18/0.215/0.25 MG-35 MCG tablet TAKE 1 TABLET DAILY 28 tablet 0     Allergies   Allergen Reactions     Other [No Clinical Screening - See Comments] Hives     Hypothyroid med (PTU)       Breast Cancer Screening:    FHS-7: No flowsheet data found.      History of abnormal Pap smear: NO - age 30-65 PAP every 5 years with negative HPV co-testing recommended  PAP / HPV Latest Ref Rng & Units 5/27/2021 6/11/2018   PAP Negative for squamous intraepithelial lesion or malignancy. Negative for squamous intraepithelial lesion or malignancy  Electronically signed by Carol Nolasco CT (ASCP) on 6/1/2021 at  4:02 PM   Negative for squamous intraepithelial lesion or malignancy  Electronically signed by Ramo Garcia CT (ASCP) on 6/14/2018 at 12:12 PM       Reviewed and updated as needed this visit by clinical staff   Tobacco   Meds                Reviewed and updated as  "needed this visit by Provider   Tobacco  Allergies  Meds  Problems  Med Hx  Surg Hx  Fam Hx  Soc   Hx         Review of Systems   Constitutional: Negative for chills and fever.   HENT: Negative for congestion, ear pain, hearing loss and sore throat.    Eyes: Negative for pain and visual disturbance.   Respiratory: Negative for cough and shortness of breath.    Cardiovascular: Negative for chest pain, palpitations and peripheral edema.   Gastrointestinal: Negative for abdominal pain, constipation, diarrhea, heartburn, hematochezia and nausea.   Breasts:  Negative for tenderness, breast mass and discharge.   Genitourinary: Negative for dysuria, frequency, genital sores, hematuria, pelvic pain, urgency, vaginal bleeding and vaginal discharge.   Musculoskeletal: Negative for arthralgias, joint swelling and myalgias.   Skin: Negative for rash.   Neurological: Negative for dizziness, weakness, headaches and paresthesias.   Psychiatric/Behavioral: Negative for mood changes. The patient is not nervous/anxious.         OBJECTIVE:   /74 (BP Location: Left arm, Patient Position: Sitting, Cuff Size: Adult Regular)   Pulse 74   Resp 16   Ht 1.581 m (5' 2.25\")   Wt 56.3 kg (124 lb 3.2 oz)   LMP 07/28/2022 (Approximate)   BMI 22.53 kg/m       Physical Exam  GENERAL: healthy, alert and no distress  EYES: Eyes grossly normal to inspection, PERRL and conjunctivae and sclerae normal  HENT: ear canals and TM's normal, nose and mouth without ulcers or lesions  NECK: no adenopathy, no asymmetry, masses, or scars and thyroid normal to palpation  RESP: lungs clear to auscultation - no rales, rhonchi or wheezes  BREAST: normal without masses, tenderness or nipple discharge and no palpable axillary masses or adenopathy  CV: regular rate and rhythm, normal S1 S2, no S3 or S4, no murmur, click or rub, no peripheral edema and peripheral pulses strong  ABDOMEN: soft, nontender, no hepatosplenomegaly, no masses and bowel sounds " normal  MS: no gross musculoskeletal defects noted, no edema  SKIN: no suspicious lesions or rashes  NEURO: Normal strength and tone, mentation intact and speech normal  PSYCH: mentation appears normal, affect normal/bright    Diagnostic Test Results:  Labs reviewed in Epic  Results for orders placed or performed in visit on 08/11/22   HIV Antigen Antibody Combo     Status: Normal   Result Value Ref Range    HIV Antigen Antibody Combo Nonreactive Nonreactive   Hepatitis C Screen Reflex to HCV RNA Quant and Genotype     Status: Normal   Result Value Ref Range    Hepatitis C Antibody Nonreactive Nonreactive    Narrative    Assay performance characteristics have not been established for newborns, infants, and children.   TSH with free T4 reflex     Status: Normal   Result Value Ref Range    TSH 2.07 0.30 - 4.20 uIU/mL   Lipid panel reflex to direct LDL Non-fasting     Status: Abnormal   Result Value Ref Range    Cholesterol 151 <200 mg/dL    Triglycerides 190 (H) <150 mg/dL    Direct Measure HDL 62 >=50 mg/dL    LDL Cholesterol Calculated 51 <=100 mg/dL    Non HDL Cholesterol 89 <130 mg/dL    Narrative    Cholesterol  Desirable:  <200 mg/dL    Triglycerides  Normal:  Less than 150 mg/dL  Borderline High:  150-199 mg/dL  High:  200-499 mg/dL  Very High:  Greater than or equal to 500 mg/dL    Direct Measure HDL  Female:  Greater than or equal to 50 mg/dL   Male:  Greater than or equal to 40 mg/dL    LDL Cholesterol  Desirable:  <100mg/dL  Above Desirable:  100-129 mg/dL   Borderline High:  130-159 mg/dL   High:  160-189 mg/dL   Very High:  >= 190 mg/dL    Non HDL Cholesterol  Desirable:  130 mg/dL  Above Desirable:  130-159 mg/dL  Borderline High:  160-189 mg/dL  High:  190-219 mg/dL  Very High:  Greater than or equal to 220 mg/dL       ASSESSMENT/PLAN:   1. Routine general medical examination at a health care facility  Routine history and physical, updated in EMR.  Nonfasting labs updated as below.  Immunizations are  "up-to-date, as is Pap smear.  Plan repeat physical in 1 year.  - Lipid panel reflex to direct LDL Non-fasting    2. Encounter for surveillance of contraceptive pills  Patient is considering trying to conceive within the next year or so.  Refills provided of oral contraceptive pill in the meantime.  - norgestim-eth estrad triphasic (ORTHO TRI-CYCLEN) 0.18/0.215/0.25 MG-35 MCG tablet; Take 1 tablet by mouth daily  Dispense: 84 tablet; Refill: 3    3. History of Graves' disease  Screening thyroid hormone level continues to be normal.  She is status post medication for hyperthyroidism.  - TSH with free T4 reflex    4. Screening for HIV (human immunodeficiency virus)  HIV screening completed today in low risk patient and negative.  - HIV Antigen Antibody Combo    5. Need for hepatitis C screening test  Hepatitis C screening completed today in low risk patient and negative.  - Hepatitis C Screen Reflex to HCV RNA Quant and Genotype    6. Lipid screening  Lipid screening updated today and within normal limits.  Triglycerides are slightly elevated in this nonfasting sample.  - Lipid panel reflex to direct LDL Non-fasting      Patient has been advised of split billing requirements and indicates understanding: Yes    COUNSELING:  Reviewed preventive health counseling, as reflected in patient instructions  Special attention given to:        Regular exercise       Healthy diet/nutrition       Contraception       Family planning       Folic Acid       Consider Hep C screening for all patients one time for ages 18-79 years       HIV screeninx in teen years, 1x in adult years, and at intervals if high risk    Estimated body mass index is 22.53 kg/m  as calculated from the following:    Height as of this encounter: 1.581 m (5' 2.25\").    Weight as of this encounter: 56.3 kg (124 lb 3.2 oz).        She reports that she has never smoked. She has never used smokeless tobacco.      Counseling Resources:  ATP IV Guidelines  Pooled " Cohorts Equation Calculator  Breast Cancer Risk Calculator  BRCA-Related Cancer Risk Assessment: FHS-7 Tool  FRAX Risk Assessment  ICSI Preventive Guidelines  Dietary Guidelines for Americans, 2010  USDA's MyPlate  ASA Prophylaxis  Lung CA Screening    Michelle Moctezuma MD  [facility]

## 2022-08-12 LAB
HCV AB SERPL QL IA: NONREACTIVE
HIV 1+2 AB+HIV1 P24 AG SERPL QL IA: NONREACTIVE

## 2023-01-14 ENCOUNTER — HEALTH MAINTENANCE LETTER (OUTPATIENT)
Age: 33
End: 2023-01-14

## 2023-01-26 ENCOUNTER — TELEPHONE (OUTPATIENT)
Dept: FAMILY MEDICINE | Facility: CLINIC | Age: 33
End: 2023-01-26
Payer: COMMERCIAL

## 2023-01-26 NOTE — TELEPHONE ENCOUNTER
Yes, thank you.  Please ask when her LMP was and which location she would prefer ([facility] or [place]) thanks!

## 2023-01-26 NOTE — TELEPHONE ENCOUNTER
I could do 9:40 either of the next 2 Tuesdays, or end of day Thursday the 9th.  If neither work, let me know.

## 2023-01-26 NOTE — TELEPHONE ENCOUNTER
Reason for Call:  Appointment Request    Patient requesting this type of appt:  Pregnancy     Requested provider: Michelle Moctezuma    Reason patient unable to be scheduled: 1st pregancy appointment    When does patient want to be seen/preferred time: ASAP    Comments: Pt called wanting to get pregnancy confirmation    Could we send this information to you in [employer] or would you prefer to receive a phone call?:   Patient would like to be contacted via [employer]    Call taken on 1/26/2023 at 10:05 AM by Katie Ochoa

## 2023-02-07 ENCOUNTER — OFFICE VISIT (OUTPATIENT)
Dept: FAMILY MEDICINE | Facility: CLINIC | Age: 33
End: 2023-02-07
Payer: COMMERCIAL

## 2023-02-07 VITALS
RESPIRATION RATE: 16 BRPM | SYSTOLIC BLOOD PRESSURE: 119 MMHG | HEART RATE: 81 BPM | WEIGHT: 127 LBS | DIASTOLIC BLOOD PRESSURE: 74 MMHG | BODY MASS INDEX: 23.04 KG/M2

## 2023-02-07 DIAGNOSIS — Z3A.01 LESS THAN 8 WEEKS GESTATION OF PREGNANCY: Primary | ICD-10-CM

## 2023-02-07 PROBLEM — Z30.41 ENCOUNTER FOR SURVEILLANCE OF CONTRACEPTIVE PILLS: Status: RESOLVED | Noted: 2018-06-17 | Resolved: 2023-02-07

## 2023-02-07 LAB
ALBUMIN UR-MCNC: NEGATIVE MG/DL
AMORPH CRY #/AREA URNS HPF: ABNORMAL /HPF
APPEARANCE UR: ABNORMAL
BACTERIA #/AREA URNS HPF: ABNORMAL /HPF
BILIRUB UR QL STRIP: NEGATIVE
COLOR UR AUTO: YELLOW
GLUCOSE UR STRIP-MCNC: NEGATIVE MG/DL
HCG UR QL: POSITIVE
HGB UR QL STRIP: NEGATIVE
KETONES UR STRIP-MCNC: NEGATIVE MG/DL
LEUKOCYTE ESTERASE UR QL STRIP: NEGATIVE
NITRATE UR QL: NEGATIVE
PH UR STRIP: 7 [PH] (ref 5–8)
RBC #/AREA URNS AUTO: ABNORMAL /HPF
SP GR UR STRIP: 1.02 (ref 1–1.03)
SQUAMOUS #/AREA URNS AUTO: ABNORMAL /LPF
UROBILINOGEN UR STRIP-ACNC: 0.2 E.U./DL
WBC #/AREA URNS AUTO: ABNORMAL /HPF

## 2023-02-07 PROCEDURE — 81025 URINE PREGNANCY TEST: CPT | Performed by: FAMILY MEDICINE

## 2023-02-07 PROCEDURE — 81001 URINALYSIS AUTO W/SCOPE: CPT | Performed by: FAMILY MEDICINE

## 2023-02-07 PROCEDURE — 99213 OFFICE O/P EST LOW 20 MIN: CPT | Performed by: FAMILY MEDICINE

## 2023-02-07 RX ORDER — PRENATAL VIT/IRON FUM/FOLIC AC 27MG-0.8MG
1 TABLET ORAL DAILY
COMMUNITY

## 2023-02-07 NOTE — PROGRESS NOTES
Assessment & Plan     Less than 8 weeks gestation of pregnancy  Patient overall has been feeling well.  Discussed early pregnancy recommendations and patient is accepting of an order for an early ultrasound to confirm dates and viability.  She was given instructions on how to schedule this.  We will assist with scheduling her first OB visit as well.  She will let me know if she has any questions prior to her next visit.  We did discuss B6 and Unisom for nausea if needed.  - UA macro with reflex to Microscopic and Culture - Clinc Collect  - Urine Microscopic  - HCG qualitative urine  - US OB < 14 Weeks Single; Future                 Return in about 4 weeks (around 3/7/2023) for First OB Visit.    Michelle Moctezuma MD  [facility]    Fela Venegas is a 32 year old, presenting for the following health issues:  Pregnancy Test (Positive at home pregnancy LMP: 12/20/22)      History of Present Illness       Reason for visit:  Pregnancy Confirmation    She eats 2-3 servings of fruits and vegetables daily.She consumes 0 sweetened beverage(s) daily.She exercises with enough effort to increase her heart rate 10 to 19 minutes per day.  She exercises with enough effort to increase her heart rate 3 or less days per week.   She is taking medications regularly.     Patient presents today for a pregnancy confirmation visit.  This is her first pregnancy.  She has never had a miscarriage.  She is  and this was a planned pregnancy.  She stopped her OCP 2 cycles prior to conception and was having regular menses.  She is sure about her LMP date.  Overall she has been feeling well, but thinks that over the past couple of days she has started to have some nausea.  She has not vomited.  She denies any leakage of fluid or bleeding, has had a mild amount of cramping.  She has no other questions or concerns today.  Based on her LMP of 12/20/2022, she is 7 weeks 0 days gestation with an EDC of  9/26/2023.    Review of Systems   Constitutional, HEENT, cardiovascular, pulmonary, gi and gu systems are negative, except as otherwise noted.      Objective    /74   Pulse 81   Resp 16   Wt 57.6 kg (127 lb)   LMP 12/20/2022 (Exact Date)   BMI 23.04 kg/m    Body mass index is 23.04 kg/m .  Physical Exam   GENERAL: healthy, alert and no distress  RESP: Breathing comfortably, no cough during the visit  MS: no gross musculoskeletal defects noted, no edema  NEURO: Normal strength and tone, mentation intact and speech normal  PSYCH: mentation appears normal, affect normal/bright    Results for orders placed or performed in visit on 02/07/23   UA macro with reflex to Microscopic and Culture - Clinc Collect     Status: Abnormal    Specimen: Urine, Clean Catch   Result Value Ref Range    Color Urine Yellow Colorless, Straw, Light Yellow, Yellow    Appearance Urine Cloudy (A) Clear    Glucose Urine Negative Negative mg/dL    Bilirubin Urine Negative Negative    Ketones Urine Negative Negative mg/dL    Specific Gravity Urine 1.020 1.005 - 1.030    Blood Urine Negative Negative    pH Urine 7.0 5.0 - 8.0    Protein Albumin Urine Negative Negative mg/dL    Urobilinogen Urine 0.2 0.2, 1.0 E.U./dL    Nitrite Urine Negative Negative    Leukocyte Esterase Urine Negative Negative   Urine Microscopic     Status: Abnormal   Result Value Ref Range    Bacteria Urine None Seen None Seen /HPF    RBC Urine 0-2 0-2 /HPF /HPF    WBC Urine None Seen 0-5 /HPF /HPF    Squamous Epithelials Urine Few (A) None Seen /LPF    Amorphous Crystals Urine Many (A) None Seen /HPF    Narrative    Urine Culture not indicated   HCG qualitative urine     Status: Abnormal   Result Value Ref Range    hCG Urine Qualitative Positive (A) Negative

## 2023-02-07 NOTE — PATIENT INSTRUCTIONS
You can call [phone] to schedule your early ultrasound.  I would recommend scheduling this on or after 2/14/23.

## 2023-02-09 PROBLEM — Z3A.01 LESS THAN 8 WEEKS GESTATION OF PREGNANCY: Status: ACTIVE | Noted: 2023-02-09

## 2023-02-15 ENCOUNTER — HOSPITAL ENCOUNTER (OUTPATIENT)
Dept: ULTRASOUND IMAGING | Facility: HOSPITAL | Age: 33
Discharge: HOME | End: 2023-02-15
Attending: FAMILY MEDICINE | Admitting: FAMILY MEDICINE
Payer: COMMERCIAL

## 2023-02-15 DIAGNOSIS — Z34.01 ENCOUNTER FOR SUPERVISION OF NORMAL FIRST PREGNANCY IN FIRST TRIMESTER: ICD-10-CM

## 2023-02-15 DIAGNOSIS — Z3A.01 LESS THAN 8 WEEKS GESTATION OF PREGNANCY: ICD-10-CM

## 2023-02-15 PROCEDURE — 76801 OB US < 14 WKS SINGLE FETUS: CPT

## 2023-03-14 ENCOUNTER — LAB (OUTPATIENT)
Dept: LAB | Facility: CLINIC | Age: 33
End: 2023-03-14
Payer: COMMERCIAL

## 2023-03-14 DIAGNOSIS — Z34.01 ENCOUNTER FOR SUPERVISION OF NORMAL FIRST PREGNANCY IN FIRST TRIMESTER: ICD-10-CM

## 2023-03-14 DIAGNOSIS — Z34.01 ENCOUNTER FOR SUPERVISION OF NORMAL FIRST PREGNANCY IN FIRST TRIMESTER: Primary | ICD-10-CM

## 2023-03-14 PROCEDURE — 36415 COLL VENOUS BLD VENIPUNCTURE: CPT

## 2023-03-20 ENCOUNTER — PRENATAL OFFICE VISIT (OUTPATIENT)
Dept: FAMILY MEDICINE | Facility: CLINIC | Age: 33
End: 2023-03-20
Payer: COMMERCIAL

## 2023-03-20 VITALS
TEMPERATURE: 98.9 F | WEIGHT: 128.7 LBS | DIASTOLIC BLOOD PRESSURE: 72 MMHG | HEART RATE: 88 BPM | OXYGEN SATURATION: 99 % | RESPIRATION RATE: 16 BRPM | SYSTOLIC BLOOD PRESSURE: 120 MMHG | BODY MASS INDEX: 23.68 KG/M2 | HEIGHT: 62 IN

## 2023-03-20 DIAGNOSIS — Z86.39 HISTORY OF GRAVES' DISEASE: ICD-10-CM

## 2023-03-20 DIAGNOSIS — Z3A.01 LESS THAN 8 WEEKS GESTATION OF PREGNANCY: ICD-10-CM

## 2023-03-20 DIAGNOSIS — Z34.01 ENCOUNTER FOR SUPERVISION OF NORMAL FIRST PREGNANCY IN FIRST TRIMESTER: Primary | ICD-10-CM

## 2023-03-20 LAB
ERYTHROCYTE [DISTWIDTH] IN BLOOD BY AUTOMATED COUNT: 12.9 % (ref 10–15)
HCT VFR BLD AUTO: 33.8 % (ref 35–47)
HGB BLD-MCNC: 11.6 G/DL (ref 11.7–15.7)
MCH RBC QN AUTO: 28.6 PG (ref 26.5–33)
MCHC RBC AUTO-ENTMCNC: 34.3 G/DL (ref 31.5–36.5)
MCV RBC AUTO: 83 FL (ref 78–100)
PLATELET # BLD AUTO: 278 10E3/UL (ref 150–450)
RBC # BLD AUTO: 4.06 10E6/UL (ref 3.8–5.2)
SCANNED LAB RESULT: NORMAL
TSH SERPL DL<=0.005 MIU/L-ACNC: 2.58 UIU/ML (ref 0.3–4.2)
WBC # BLD AUTO: 8.3 10E3/UL (ref 4–11)

## 2023-03-20 PROCEDURE — 36415 COLL VENOUS BLD VENIPUNCTURE: CPT | Performed by: FAMILY MEDICINE

## 2023-03-20 PROCEDURE — 99207 PR FIRST OB VISIT: CPT | Performed by: FAMILY MEDICINE

## 2023-03-20 PROCEDURE — 86850 RBC ANTIBODY SCREEN: CPT | Performed by: FAMILY MEDICINE

## 2023-03-20 PROCEDURE — 99214 OFFICE O/P EST MOD 30 MIN: CPT | Performed by: FAMILY MEDICINE

## 2023-03-20 PROCEDURE — 86780 TREPONEMA PALLIDUM: CPT | Performed by: FAMILY MEDICINE

## 2023-03-20 PROCEDURE — 86762 RUBELLA ANTIBODY: CPT | Performed by: FAMILY MEDICINE

## 2023-03-20 PROCEDURE — 87340 HEPATITIS B SURFACE AG IA: CPT | Performed by: FAMILY MEDICINE

## 2023-03-20 PROCEDURE — 87086 URINE CULTURE/COLONY COUNT: CPT | Performed by: FAMILY MEDICINE

## 2023-03-20 PROCEDURE — 86706 HEP B SURFACE ANTIBODY: CPT | Performed by: FAMILY MEDICINE

## 2023-03-20 PROCEDURE — 86901 BLOOD TYPING SEROLOGIC RH(D): CPT | Performed by: FAMILY MEDICINE

## 2023-03-20 PROCEDURE — 87591 N.GONORRHOEAE DNA AMP PROB: CPT | Performed by: FAMILY MEDICINE

## 2023-03-20 PROCEDURE — 87491 CHLMYD TRACH DNA AMP PROBE: CPT | Performed by: FAMILY MEDICINE

## 2023-03-20 PROCEDURE — 87389 HIV-1 AG W/HIV-1&-2 AB AG IA: CPT | Performed by: FAMILY MEDICINE

## 2023-03-20 PROCEDURE — 85027 COMPLETE CBC AUTOMATED: CPT | Performed by: FAMILY MEDICINE

## 2023-03-20 PROCEDURE — 84443 ASSAY THYROID STIM HORMONE: CPT | Performed by: FAMILY MEDICINE

## 2023-03-20 ASSESSMENT — PAIN SCALES - GENERAL: PAINLEVEL: NO PAIN (0)

## 2023-03-20 NOTE — PROGRESS NOTES
"    SUBJECTIVE:     HPI:     This is a 32 year old female patient,  who presents for her first obstetrical visit.    CHRIS: 2023, by Last Menstrual Period.  She is 12w6d weeks.  Her cycles are regular.  Her last menstrual period was normal.   Since her LMP, she has experienced  nausea and fatigue.   She denies vaginal discharge, dysuria and vaginal bleeding.    Additional History: Patient reports that she continues with some first trimester nuisance symptoms like fatigue and breast tenderness.  Nausea seems to have improved.  She denies any leakage of fluid or bleeding.  She just received the results of her [employer] testing this morning and this was normal.    Have you travelled during the pregnancy?No  Have your sexual partner(s) travelled during the pregnancy?No      HISTORY:   Planned Pregnancy: Yes  Marital Status:   Occupation:  for [employer]  Living in Household: Spouse    Past History:  Her past medical history   Past Medical History:   Diagnosis Date     Hyperthyroidism      Hyperthyroidism     in [facility], treated with PTU for 4 years   .      She has a history of  No prior pregnancies    Since her last LMP she denies use of alcohol, tobacco and street drugs.    Past medical, surgical, social and family history were reviewed and updated in [facility].      Current Outpatient Medications   Medication     Prenatal Vit-Fe Fumarate-FA (PRENATAL MULTIVITAMIN W/IRON) 27-0.8 MG tablet     No current facility-administered medications for this visit.       ROS:   12 point review of systems negative other than symptoms noted below or in the HPI.      OBJECTIVE:     EXAM:  /72 (BP Location: Right arm, Patient Position: Sitting, Cuff Size: Adult Regular)   Pulse 88   Temp 98.9  F (37.2  C) (Temporal)   Resp 16   Ht 1.585 m (5' 2.4\")   Wt 58.4 kg (128 lb 11.2 oz)   LMP 2022 (Exact Date)   SpO2 99%   BMI 23.24 kg/m   Body mass index is 23.24 kg/m .    GENERAL: healthy, alert " and no distress  EYES: Eyes grossly normal to inspection, PERRL and conjunctivae and sclerae normal  HENT: ear canals and TM's normal, nose and mouth without ulcers or lesions  NECK: no adenopathy, no asymmetry, masses, or scars and thyroid normal to palpation  RESP: lungs clear to auscultation - no rales, rhonchi or wheezes  BREAST: normal without masses, tenderness or nipple discharge and no palpable axillary masses or adenopathy  CV: regular rate and rhythm, normal S1 S2, no S3 or S4, no murmur, click or rub, no peripheral edema and peripheral pulses strong  ABDOMEN: soft, nontender, no hepatosplenomegaly, no masses and bowel sounds normal   (female): External genitalia appear normal, speculum exam deferred  MS: no gross musculoskeletal defects noted, no edema  SKIN: no suspicious lesions or rashes  NEURO: Normal strength and tone, mentation intact and speech normal  PSYCH: mentation appears normal, affect normal/bright    ASSESSMENT/PLAN:     1. Encounter for supervision of normal first pregnancy in first trimester  Routine first OB visit, updated in EMR.  Overall patient is feeling well.  Routine labs will be ordered as below.  Patient is unsure of her hepatitis B vaccine status, immigrated [place] from [facility] when she was 10.  Hepatitis B surface antibody will be obtained today.  Plan next routine visit in 4 weeks.  - TSH with free T4 reflex; Future  - Hepatitis B Surface Antibody; Future  - ABO/Rh type and screen  - Hepatitis B surface antigen  - CBC with platelets  - HIV Antigen Antibody Combo  - Rubella Antibody IgG  - Treponema Abs w Reflex to RPR and Titer  - TSH with free T4 reflex  - Hepatitis B Surface Antibody  - Chlamydia trachomatis PCR  - Neisseria gonorrhoeae PCR  - Urine Culture Aerobic Bacterial    2. History of Graves' disease  Patient has a history of Graves' disease treated with oral medication which resolved spontaneously.  Plan recheck TSH today.  - TSH with free T4 reflex      32  year old , 12w6d weeks of pregnancy with CHRIS of 2023, by Last Menstrual Period    Discussed as follows: See above    Counseling given:   - Follow up in 4-6 weeks for return OB visit.  - Recommended weight gain for pregnancy: 25-35 lbs.         Michelle Moctezuma MD

## 2023-03-21 LAB
ABO/RH(D): NORMAL
ANTIBODY SCREEN: NEGATIVE
C TRACH DNA SPEC QL NAA+PROBE: NEGATIVE
HBV SURFACE AB SERPL IA-ACNC: 0.45 M[IU]/ML
HBV SURFACE AB SERPL IA-ACNC: NONREACTIVE M[IU]/ML
HBV SURFACE AG SERPL QL IA: NONREACTIVE
HIV 1+2 AB+HIV1 P24 AG SERPL QL IA: NONREACTIVE
N GONORRHOEA DNA SPEC QL NAA+PROBE: NEGATIVE
RUBV IGG SERPL QL IA: 3.22 INDEX
RUBV IGG SERPL QL IA: POSITIVE
SPECIMEN EXPIRATION DATE: NORMAL
T PALLIDUM AB SER QL: NONREACTIVE

## 2023-03-22 LAB — BACTERIA UR CULT: NORMAL

## 2023-03-30 ENCOUNTER — OFFICE VISIT (OUTPATIENT)
Dept: FAMILY MEDICINE | Facility: CLINIC | Age: 33
End: 2023-03-30
Payer: COMMERCIAL

## 2023-03-30 VITALS
DIASTOLIC BLOOD PRESSURE: 72 MMHG | BODY MASS INDEX: 23.18 KG/M2 | HEART RATE: 82 BPM | WEIGHT: 128.4 LBS | SYSTOLIC BLOOD PRESSURE: 109 MMHG

## 2023-03-30 DIAGNOSIS — O26.852 SPOTTING AFFECTING PREGNANCY IN SECOND TRIMESTER: ICD-10-CM

## 2023-03-30 DIAGNOSIS — Z34.01 ENCOUNTER FOR SUPERVISION OF NORMAL FIRST PREGNANCY IN FIRST TRIMESTER: Primary | ICD-10-CM

## 2023-03-30 LAB
CLUE CELLS: ABNORMAL
TRICHOMONAS, WET PREP: ABNORMAL
WBC'S/HIGH POWER FIELD, WET PREP: ABNORMAL
YEAST, WET PREP: ABNORMAL

## 2023-03-30 PROCEDURE — 99207 PR PRENATAL VISIT: CPT | Performed by: FAMILY MEDICINE

## 2023-03-30 PROCEDURE — 87210 SMEAR WET MOUNT SALINE/INK: CPT | Performed by: FAMILY MEDICINE

## 2023-03-30 RX ORDER — FERROUS SULFATE 325(65) MG
325 TABLET ORAL
COMMUNITY
End: 2023-03-30

## 2023-04-01 PROBLEM — O26.852 SPOTTING AFFECTING PREGNANCY IN SECOND TRIMESTER: Status: ACTIVE | Noted: 2023-04-01

## 2023-04-01 NOTE — PROGRESS NOTES
Patient presents today with her  for a discussion of light vaginal spotting and cramping.  This started 3 days ago and was episodic for 2 days.  Today she has had no spotting, but reports a mild lower abdominal discomfort.  She denies fever, vaginal itching or burning, burning or pain with urination.  On examination, there is no blood in the vaginal vault and no obvious source of bleeding on speculum exam.  Cervix is closed and thick without lesions.  There is a moderate amount of white clumpy discharge which was sampled for wet prep and is negative for yeast or bacterial overgrowth.  FHTs are heard with bedside doppler and handheld ultrasound shows an active fetus.  Patient is B pos blood type.  She will continue to monitor spotting and will let me know if this recurs.  Would order follow-up ultrasound at that point.  Concerns: light spotting, cramping  Reportable signs and symptoms discussed.  RTC 1 week as previously scheduled.      Michelle Moctezuma MD

## 2023-04-06 ENCOUNTER — PRENATAL OFFICE VISIT (OUTPATIENT)
Dept: FAMILY MEDICINE | Facility: CLINIC | Age: 33
End: 2023-04-06
Payer: COMMERCIAL

## 2023-04-06 VITALS
BODY MASS INDEX: 23.58 KG/M2 | HEART RATE: 79 BPM | TEMPERATURE: 98.3 F | WEIGHT: 130.6 LBS | RESPIRATION RATE: 16 BRPM | SYSTOLIC BLOOD PRESSURE: 111 MMHG | DIASTOLIC BLOOD PRESSURE: 63 MMHG

## 2023-04-06 DIAGNOSIS — Z34.01 ENCOUNTER FOR SUPERVISION OF NORMAL FIRST PREGNANCY IN FIRST TRIMESTER: Primary | ICD-10-CM

## 2023-04-06 PROCEDURE — 99207 PR PRENATAL VISIT: CPT | Performed by: FAMILY MEDICINE

## 2023-04-07 NOTE — PROGRESS NOTES
Patient reports that she has been feeling well.  She denies any further episodes of spotting.  She will be flying to [place] prior to her next visit, and asked very appropriate questions about travel today.  She is not yet feeling fetal movement, and we discussed that this is normal.  Plan next visit in 4 weeks with order for fetal survey ultrasound at next visit.  Concerns: None  Doing well.  No concerns today.  Reportable signs and symptoms discussed.  Will schedule anatomy ultrasound after next visit.  RTC 4 weeks.      Michelle Moctezuma MD

## 2023-04-07 NOTE — PATIENT INSTRUCTIONS
Pregnancy: Your Second Trimester Changes  Each day, you and your baby are changing and growing together. Here s a quick look at what s happening to both of you.  How you are changing  Even when you don t notice it, your body is adapting to meet the needs of your growing baby. The changes in your body might also affect your moods.  Your body  Your uterus expands as your baby grows. As the weeks go by, you will feel more pressure on your bladder, stomach, and other organs. You may notice some skin color changes on your forehead, nose, or cheeks. Freckles may darken, and moles may grow. You may notice a darker line on your abdomen between your belly button and pubic bone in the midline.  Your moods  The second trimester is often easier than the first. Still, be prepared for mood swings. These are from the increase in hormones made by your body. Hormones are chemicals that affect the way organs work. These mood swings are a normal part of pregnancy.  How your baby is growing    Month 4  Your baby s heartbeat may be heard with a Doppler (handheld ultrasound device) by 9 to 10 weeks. Eyebrows, eyelashes, and fingernails begin to form.    Month 5  You may feel your baby move. After a growth spurt, your baby nears 10 inches.    Month 6  Your baby s fingerprints have formed. Your baby weighs about 1 to 2 pounds and is about 12 inches long.    [email or web address] last reviewed this educational content on 7/1/2021 2000-2022 [employer]. All rights reserved. This information is not intended as a substitute for professional medical care. Always follow your healthcare professional's instructions.          Adapting to Pregnancy: Second Trimester  Keep up the healthy habits you started in your first trimester. You might be a little more tired than normal. So plan your day wisely. Look at the tips below and choose the ones that suit your lifestyle.   Note  If you have any questions, talk with your healthcare provider.   If  you work  If you can, adjust your work with your employer to fit your needs. Try these tips:     If you stand for long periods, find ways to do some tasks while sitting. Also, try to stand with 1 foot resting on a low stool or ledge. Shift your weight from foot to foot often. Wear low-heeled shoes.    If you sit, keep your knees level with your hips. Rest your feet on a firm surface. Sit tall with support for your low back.    If you work long hours, ask about adjusting your schedule. Try taking shorter breaks more often.  When you travel  The second trimester may be the best time for any travel. Talk to your healthcare provider about any special plans you may need to make. Always:     Wear a seat belt. Fasten the lap part under your belly. Wear the shoulder part also.    Take breaks often during long trips by car or plane. Move around to stretch your legs.    Drink plenty of fluids on flights. The air in plane cabins is very dry.    Stay out of hot climates or high altitudes if you are not used to them.    Stay away from places where the food and water might make you sick.    Make sure you are up-to-date on all vaccines, including the flu vaccine. This is especially important when traveling overseas.  Taking time to relax  Find time to rest and relax at work or at home:     Take short time-outs daily. Do relaxation exercises.    Breathe deeply during stressful times.    Try not to take on too much. Plan tasks for times when you have the most energy.    Take naps when you can. Or just sit and relax.    After week 16, don't lie on your back for more than a few minutes. Instead, lie on your side. Switch sides often.    Having sex  Unless your healthcare provider tells you otherwise, there is no reason to stop having sex now. Blood supply increases to the pelvic area in the second trimester. Because of this, sex might be more enjoyable. Try different positions and see what s best. Also talk with your partner about any  changes in desire. Spotting may happen after sex. Let your healthcare provider know if there is heavy bleeding.   Keeping your environment safe  You can still clean your house and use scented products. Just take some simple precautions:     Wear gloves when using cleaning fluids.    Open windows to let in fresh air. Use a fan if you paint.    Stay away from secondhand smoke.    Don t breathe fumes from nail polish, hair spray, cleansers, or other chemicals.  How daily issues affect your health  Many things in your daily life impact your health. This can include transportation, money problems, housing, access to food, and . If you can t get to medical appointments, you may not receive the care you need. When money is tight, it may be difficult to pay for medicines. And living far from a grocery store can make it hard to buy healthy food.   If you have concerns in any of these or other areas, talk with your healthcare team. They may know of local resources to assist you. Or they may have a staff person who can help.   [employer] last reviewed this educational content on 6/1/2021 2000-2022 [employer]. All rights reserved. This information is not intended as a substitute for professional medical care. Always follow your healthcare professional's instructions.

## 2023-05-11 ENCOUNTER — PRENATAL OFFICE VISIT (OUTPATIENT)
Dept: FAMILY MEDICINE | Facility: CLINIC | Age: 33
End: 2023-05-11
Payer: COMMERCIAL

## 2023-05-11 VITALS
DIASTOLIC BLOOD PRESSURE: 62 MMHG | OXYGEN SATURATION: 98 % | BODY MASS INDEX: 24.2 KG/M2 | SYSTOLIC BLOOD PRESSURE: 107 MMHG | RESPIRATION RATE: 16 BRPM | HEIGHT: 63 IN | WEIGHT: 136.6 LBS | HEART RATE: 81 BPM | TEMPERATURE: 98.5 F

## 2023-05-11 DIAGNOSIS — Z34.01 ENCOUNTER FOR SUPERVISION OF NORMAL FIRST PREGNANCY IN FIRST TRIMESTER: Primary | ICD-10-CM

## 2023-05-11 PROCEDURE — 99207 PR PRENATAL VISIT: CPT | Performed by: FAMILY MEDICINE

## 2023-05-11 NOTE — PATIENT INSTRUCTIONS
The company we used previously for prenatal classes is called [employer].  Feel free to search them online and see their class offerings.    You can call [phone] to schedule your fetal survey ultrasound at your convenience.    Plan 1 hour glucose test next visit.      Understanding Round Ligament Pain in Pregnancy   Round ligament pain is a common problem in pregnancy. Ligaments are strong tissues that connect bones, muscles, and organs. There are 2 round ligaments. There is 1 on each side of the uterus. The top part of each ligament attaches to the upper side of the uterus. The bottom of each ligament attaches down in the pubic area. These ligaments help keep the uterus in place as you move around.     What causes round ligament pain in pregnancy?   As your uterus grows during pregnancy, the round ligaments are stretched and work harder when you move around. They may stretch too quickly when you stand up or bend or laugh. Nearby nerves may be irritated, or the ligaments may have a painful spasm.   Symptoms of ligament pain in pregnancy  The symptoms are sharp pains that last a few seconds. The pain may happen most often on the right side of the belly. It may happen in the hip, the lower belly, or even deep down in your pubic area. The pain may happen when you:   Move suddenly  Stand up  Walk  Roll over in bed  Laugh  Cough  Sneeze  Diagnosing round ligament pain in pregnancy   Your healthcare provider will ask about your symptoms and give you a physical exam. He or she may give you tests to check for other problems that can cause pain, such as an ovarian cyst or enlarged vein (varicocele). He or she will also check for signs of  labor or other pregnancy problems.   Treatment for round ligament pain in pregnancy   To help prevent pain:  Move slowly when you stand up, roll over, turn, or bend.  Don t stand for long periods of time.  Don t lift heavy objects.  Do gentle daily stretches of your hip  joints.  When to call your healthcare provider  Call your healthcare provider right away if you have any of these:   Fever of 100.4 F (38 C) or higher  Pains that last more than a few minutes  Pain that gets worse  Bleeding, nausea, vomiting, or other new symptoms  [employer] last reviewed this educational content on 3/1/2020    9652-4096 [employer]. All rights reserved. This information is not intended as a substitute for professional medical care. Always follow your healthcare professional's instructions.          Relieving Back Pain During Pregnancy: Wall Stretch, Body Bend   Before trying these exercises, talk to your healthcare provider to make sure they are safe for you. Ask your healthcare provider how many times to do each exercise.   Wall stretch  This strengthens and loosens the muscles in your upper back:   Lean against a wall with a firm pillow or rolled towel under your shoulder blades. Your feet should be about 12 inches from the wall and shoulder-width apart. Point your chin down.  Breathe in. Push your shoulders, neck, and head against the wall. You will feel a stretch in your shoulders.  Hold for 5 counts. Then breathe out, and relax your shoulders and neck.   Body bend  This strengthens your back and buttocks muscles:   Stand with your legs shoulder-width apart. Put your hands on your upper thighs and bend your knees slightly.  Slowly bend forward at the hips. Push your hips back and keep your shoulders up. Make sure your back is straight. You ll feel a stretch in your upper thighs. You ll also feel your back muscles holding you in position.  Hold for 5 counts, then straighten.   Phil last reviewed this educational content on 7/1/2021 2000-2022 [employer]. All rights reserved. This information is not intended as a substitute for professional medical care. Always follow your healthcare professional's instructions.          Pregnancy: Planning Your Exercise  Routine  While you re pregnant, an exercise routine helps both your mind and your body feel good. It tones your muscles and makes them stronger. It also gives you and your baby more oxygen.   The right exercise for you    Overall conditioning is best for you and your baby. Try walking, swimming, or riding a stationary bike. Always warm up, cool down, and drink enough fluids. Keep a snack close by in case your blood sugar gets low. Discuss exercise choices with your healthcare provider. Talk about the following:   If you already exercise, find out how to adapt your routine while you re pregnant. Keep the intensity of the exercise moderate. As your pregnancy progresses, your center of gravity will change. Be careful to keep your balance.  Ask if there are any local prenatal exercise classes, such as yoga or water aerobics. Find out which prenatal exercise videos are good choices.  If you were not exercising before your pregnancy, find out the best way to start. Now is not the time to begin a new workout on your own. Start slowly. Listen to your body.  Ask which forms of exercise you should avoid. These may include risky activities like hot yoga, horseback riding, scuba diving, skiing, skating, and contact sports.  Pelvic tilts  These help strengthen your stomach muscles and low back. You can do pelvic tilts instead of sit-ups.   Do this exercise on your hands and knees.  Relax the back of your neck. Pull your stomach in until your low back flattens.  Hold for 30 seconds. Release. Repeat 10 times. Do this twice a day.  Kegel exercises  Kegel exercises strengthen the pelvic muscles. Doing Kegels daily helps prepare these muscles for delivery. Kegels also help ease your recovery. You exercise these muscles by tightening, holding, then relaxing them. To do 1 type of Kegel exercise, contract as if you were stopping your urine stream (but do it when you re not urinating). Hold for 10 seconds, then repeat 10 times, a few  times a day.   Tips to add activity  Here are some tips to follow:  Park the car farther from a store and walk.  If you can, do errands on foot instead of driving.  Walk across the office to talk to someone in person instead of calling.  While waiting for appointments, go up and down stairs or around the block.  Tips to stay active  Here are some tips to follow:  Maintain your routine. But exercise less intensely if you feel tired.  Base your workout on how you feel, not your heart rate. Heart rates aren t a good way to measure effort during pregnancy.  Don't exercise on your back after week 16.  What are the warning signs that I should stop exercising?  Stop exercising and call your healthcare provider if you have any of these symptoms:  Vaginal bleeding   Dizziness or feeling faint   Increased shortness of breath   Chest pain   Headache   Muscle weakness   Calf (back of the leg) pain or swelling    Uterine contractions or  labor   Decreased fetal movement   Fluid leaking (or gushing) from your vagina  Phil last reviewed this educational content on 2021-2022 [employer]. All rights reserved. This information is not intended as a substitute for professional medical care. Always follow your healthcare professional's instructions.

## 2023-05-11 NOTE — PROGRESS NOTES
Patient reports that overall she feels well.  She continues to not feel fetal movement.  We put the hand-held ultrasound on today and baby happens to be in breech position, but is moving frequently.  She denies leakage of fluid or bleeding, denies headaches, right upper quadrant pain, or lower extremity edema.  Discussed and ordered fetal survey ultrasound today.  Discussed 1 hour glucose, will be done at next visit.  Discussed prenatal classes and patient was given a resource in this regard.  Concerns today: None  Doing well.  No concerns today.  No nausea/vomiting. No heartburn.  No vaginal bleeding, no contractions/severe cramping, no leakage of fluid.  No vaginal discharge. No dysuria  No headache, vision changes, lower extremity swelling, upper abdominal pain, chest pain, shortness of breath.   Discussed PTL, PROM, and when to call or come in.  Reportable signs and symptoms discussed.      Michelle Moctezuma MD

## 2023-05-16 ENCOUNTER — HOSPITAL ENCOUNTER (OUTPATIENT)
Dept: ULTRASOUND IMAGING | Facility: HOSPITAL | Age: 33
Discharge: HOME | End: 2023-05-16
Attending: FAMILY MEDICINE | Admitting: FAMILY MEDICINE
Payer: COMMERCIAL

## 2023-05-16 DIAGNOSIS — Z34.01 ENCOUNTER FOR SUPERVISION OF NORMAL FIRST PREGNANCY IN FIRST TRIMESTER: ICD-10-CM

## 2023-05-16 PROCEDURE — 76805 OB US >/= 14 WKS SNGL FETUS: CPT

## 2023-06-03 ENCOUNTER — LAB (OUTPATIENT)
Dept: LAB | Facility: CLINIC | Age: 33
End: 2023-06-03
Payer: COMMERCIAL

## 2023-06-03 DIAGNOSIS — R30.0 DYSURIA: ICD-10-CM

## 2023-06-03 LAB
ALBUMIN UR-MCNC: NEGATIVE MG/DL
APPEARANCE UR: CLEAR
BILIRUB UR QL STRIP: NEGATIVE
COLOR UR AUTO: YELLOW
GLUCOSE UR STRIP-MCNC: NEGATIVE MG/DL
HGB UR QL STRIP: NEGATIVE
KETONES UR STRIP-MCNC: NEGATIVE MG/DL
LEUKOCYTE ESTERASE UR QL STRIP: NEGATIVE
NITRATE UR QL: NEGATIVE
PH UR STRIP: 6.5 [PH] (ref 5–7)
SP GR UR STRIP: 1.01 (ref 1–1.03)
UROBILINOGEN UR STRIP-MCNC: NORMAL MG/DL

## 2023-06-03 PROCEDURE — 81003 URINALYSIS AUTO W/O SCOPE: CPT | Performed by: PATHOLOGY

## 2023-06-06 ENCOUNTER — PRENATAL OFFICE VISIT (OUTPATIENT)
Dept: FAMILY MEDICINE | Facility: CLINIC | Age: 33
End: 2023-06-06
Payer: COMMERCIAL

## 2023-06-06 VITALS
RESPIRATION RATE: 16 BRPM | BODY MASS INDEX: 24.73 KG/M2 | TEMPERATURE: 97.9 F | WEIGHT: 139.6 LBS | HEART RATE: 82 BPM | OXYGEN SATURATION: 100 % | HEIGHT: 63 IN | SYSTOLIC BLOOD PRESSURE: 111 MMHG | DIASTOLIC BLOOD PRESSURE: 69 MMHG

## 2023-06-06 DIAGNOSIS — Z34.01 ENCOUNTER FOR SUPERVISION OF NORMAL FIRST PREGNANCY IN FIRST TRIMESTER: Primary | ICD-10-CM

## 2023-06-06 LAB
GLUCOSE 1H P 50 G GLC PO SERPL-MCNC: 103 MG/DL (ref 70–129)
HGB BLD-MCNC: 11.5 G/DL (ref 11.7–15.7)

## 2023-06-06 PROCEDURE — 36415 COLL VENOUS BLD VENIPUNCTURE: CPT | Performed by: FAMILY MEDICINE

## 2023-06-06 PROCEDURE — 86780 TREPONEMA PALLIDUM: CPT | Performed by: FAMILY MEDICINE

## 2023-06-06 PROCEDURE — 82950 GLUCOSE TEST: CPT | Performed by: FAMILY MEDICINE

## 2023-06-06 PROCEDURE — 99207 PR PRENATAL VISIT: CPT | Performed by: FAMILY MEDICINE

## 2023-06-06 NOTE — PATIENT INSTRUCTIONS
You can call [phone] to schedule your follow-up ultrasound at your convenience.      Blood Glucose Screening During Pregnancy  Gestational diabetes is diabetes that only pregnant women get. Changes in your body during pregnancy can cause high blood sugar (glucose). This can cause problems for you and your baby. It is a serious condition. But it can be controlled.     Your healthcare provider will talk with you about blood glucose screening.     Who is at risk for gestational diabetes?  You are at risk of getting gestational diabetes if any of the risk factors below apply to you. The risk for this condition gets higher as your number of risk factors increases:  You are , , , , or .  You weigh more than your healthcare provider says is healthy for you.  You have a relative with diabetes.  You are older than 25.  You had gestational diabetes during a past pregnancy.  You had a stillbirth or a very large baby before.  You have a history of abnormal glucose tolerance.  You have sugar in your urine at the first prenatal visit.  You have metabolic syndrome, PCOS (polycystic ovary syndrome), are using glucocorticoids, or have high blood pressure.  You are pregnant with twins or more  What happens during a screening?  Here is what to expect during a blood glucose screening:  There is conflicting advice for screening. But the American College of Obstetricians and Gynecologists currently advises that all pregnant women be screened for gestational diabetes. When you are screened depends on your risk. Women are tested at 24 to 28 weeks of pregnancy. Women at high risk may be tested when they first learn they are pregnant.  To do the screening, a blood sample is taken. Your blood sugar level is measured.  If the results show a high blood sugar level, a glucose tolerance test may be ordered. You will drink a certain amount of sugar. This test measures how long it  takes for sugar to leave your blood. The test will show if you have gestational diabetes.  What to know if you test positive  Here are some things you need to know:  Gestational diabetes can be treated. The best way to control it is to find out you have it early and start treatment quickly.  This condition can cause problems for the mother during pregnancy. It can also cause problems with the baby during pregnancy, delivery, and after. Treatment greatly lowers the chance for problems.  The changes in your body that cause gestational diabetes normally happen only when you are pregnant. After the baby is born, your body goes back to normal. The condition goes away. But you may be more likely to have type 2 diabetes later. Talk with your healthcare provider about ways to help prevent type 2 diabetes.  Treating gestational diabetes  Here is how to treat gestational diabetes:  You ll need to check your blood sugar often. You can do this at home. Prick your finger and check a drop of blood on a glucose monitor. Your healthcare provider will show you how and when to check your blood sugar. They will talk about your target blood sugar level.  To manage your blood sugar, you will be given a special plan. It will likely include meal planning and getting regular exercise. Some women need to take a hormone called insulin. Others may take medicine to help control their blood sugar.  [employer] last reviewed this educational content on 8/1/2020 2000-2022 [employer]. All rights reserved. This information is not intended as a substitute for professional medical care. Always follow your healthcare professional's instructions.          Tdap (Tetanus, Diphtheria, Pertussis) Vaccine: What You Need to Know    This is a Vaccine Information Statement from the CDC.   Many vaccine information statements are available in [place]ian and other languages. See www.immunize.org/vis   Hojas de información sobre vacunas están  disponibles en español y en muchos otros idiomas. Visite www.immunize.org/vis   1. Why get vaccinated?   Tdap vaccine can prevent tetanus, diphtheria, and pertussis.   Diphtheria and pertussis spread from person to person. Tetanus enters the body through cuts or wounds.   TETANUS (T) causes painful stiffening of the muscles. Tetanus can lead to serious health problems, including being unable to open the mouth, having trouble swallowing and breathing, or death.  DIPHTHERIA (D) can lead to difficulty breathing, heart failure, paralysis, or death.  PERTUSSIS (aP), also known as  whooping cough,  can cause uncontrollable, violent coughing that makes it hard to breathe, eat, or drink. Pertussis can be extremely serious especially in babies and young children, causing pneumonia, convulsions, brain damage, or death. In teens and adults, it can cause weight loss, loss of bladder control, passing out, and rib fractures from severe coughing.  2. Tdap vaccine   Tdap is only for children 7 years and older, adolescents, and adults.   Adolescents should receive a single dose of Tdap, preferably at age 11 or 12 years.   Pregnant people should get a dose of Tdap during every pregnancy, preferably during the early part of the third trimester, to help protect the  from pertussis. Infants are most at risk for severe, life-threatening complications from pertussis.   Adults who have never received Tdap should get a dose of Tdap.   Also, adults should receive a booster dose of either Tdap or Td (a different vaccine that protects against tetanus and diphtheria but not pertussis) every 10 years , or after 5 years in the case of a severe or dirty wound or burn.   Tdap may be given at the same time as other vaccines.   3. Talk with your health care provider  Tell your vaccination provider if the person getting the vaccine:   Has had an allergic reaction after a previous dose of any vaccine that protects against tetanus, diphtheria, or  pertussis , or has any severe, life-threatening allergies  Has had a coma, decreased level of consciousness, or prolonged seizures within 7 days after a previous dose of any pertussis vaccine (DTP, DTaP, or Tdap)  Has seizures or another nervous system problem  Has ever had Guillain-Barré Syndrome (also called  GBS )  Has had severe pain or swelling after a previous dose of any vaccine that protects against tetanus or diphtheria  In some cases, your health care provider may decide to postpone Tdap vaccination until a future visit.   People with minor illnesses, such as a cold, may be vaccinated. People who are moderately or severely ill should usually wait until they recover before getting Tdap vaccine.   Your health care provider can give you more information.  4. Risks of a vaccine reaction   Pain, redness, or swelling where the shot was given, mild fever, headache, feeling tired, and nausea, vomiting, diarrhea, or stomachache sometimes happen after Tdap vaccination.  People sometimes faint after medical procedures, including vaccination. Tell your provider if you feel dizzy or have vision changes or ringing in the ears.   As with any medicine, there is a very remote chance of a vaccine causing a severe allergic reaction, other serious injury, or death.   5. What if there is a serious problem?  An allergic reaction could occur after the vaccinated person leaves the clinic. If you see signs of a severe allergic reaction (hives, swelling of the face and throat, difficulty breathing, a fast heartbeat, dizziness, or weakness), call 9-1-1 and get the person to the nearest hospital.   For other signs that concern you, call your health care provider.   Adverse reactions should be reported to the Vaccine Adverse Event Reporting System (VAERS). Your health care provider will usually file this report, or you can do it yourself. Visit the VAERS website at www.vaers.hhs.gov or call 1-583.433.3940. VAERS is only for reporting  reactions, and [facility] staff members do not give medical advice.   6. The National Vaccine Injury Compensation Program  The National Vaccine Injury Compensation Program (VICP) is a federal program that was created to compensate people who may have been injured by certain vaccines. Claims regarding alleged injury or death due to vaccination have a time limit for filing, which may be as short as two years. Visit the VICP website at www.[facility].gov/vaccinecompensation or call 1-933.152.8173 to learn about the program and about filing a claim.   7. How can I learn more?   Ask your health care provider.  Call your local or state health department.  Visit the website of the Food and Drug Administration (FDA) for vaccine package inserts and additional information at www.fda.gov/vaccines-blood-biologics/vaccines.  Contact the Centers for Disease Control and Prevention (CDC): Call 1-463.789.9086 ( 5-011-WHO-INFO) or  Visit CDC s website at www.cdc.gov/vaccines.  Vaccine Information Statement   Tdap (Tetanus, Diphtheria, Pertussis) Vaccine  42 U.S.C.   [id number]  8/6/2021  [employer] last reviewed this educational content on     2450-1993 [employer]. All rights reserved. This information is not intended as a substitute for professional medical care. Always follow your healthcare professional's instructions.        You have been provided the My Labor and Birth Wishes document.  Please review at home and bring to your next prenatal visit. Bring this sheet to the hospital for your birth. Give copies to your care team members and support person.   Additional copies can be found here:  [email or web address]

## 2023-06-06 NOTE — PROGRESS NOTES
Patient continues to feel well.  She has been taking her iron supplement about twice weekly on average.  Completed 1 hour GCT today along with recheck hemoglobin, which is stable.  Follow-up ultrasound ordered to recheck spine, not well seen on previous ultrasound.  She denies any further bleeding, denies contractions, RUQ pain, headaches or blurred vision.  She reports good fetal movement.  Concerns: None  Doing well.  No concerns today.  No vaginal bleeding, no contractions, no leakage of fluid  No nausea/vomiting. No heartburn  No vaginal discharge. No dysuria.   No headache, vision changes, lower extremity swelling, upper abdominal pain, chest pain, shortness of breath  Reportable signs and symptoms discussed.  Discussed PTL, PROM, and when to call or come in.  Normal anatomy ultrasound.  RTC 4 weeks.  GTT and labs today       Michelle Moctezuma MD

## 2023-06-07 LAB — T PALLIDUM AB SER QL: NONREACTIVE

## 2023-06-09 ENCOUNTER — HOSPITAL ENCOUNTER (OUTPATIENT)
Dept: ULTRASOUND IMAGING | Facility: HOSPITAL | Age: 33
Discharge: HOME | End: 2023-06-09
Attending: FAMILY MEDICINE | Admitting: FAMILY MEDICINE
Payer: COMMERCIAL

## 2023-06-09 DIAGNOSIS — Z34.01 ENCOUNTER FOR SUPERVISION OF NORMAL FIRST PREGNANCY IN FIRST TRIMESTER: ICD-10-CM

## 2023-06-09 PROCEDURE — 76815 OB US LIMITED FETUS(S): CPT

## 2023-07-06 ENCOUNTER — PRENATAL OFFICE VISIT (OUTPATIENT)
Dept: FAMILY MEDICINE | Facility: CLINIC | Age: 33
End: 2023-07-06
Payer: COMMERCIAL

## 2023-07-06 VITALS
BODY MASS INDEX: 25.3 KG/M2 | HEIGHT: 63 IN | DIASTOLIC BLOOD PRESSURE: 65 MMHG | HEART RATE: 79 BPM | WEIGHT: 142.8 LBS | RESPIRATION RATE: 20 BRPM | OXYGEN SATURATION: 98 % | SYSTOLIC BLOOD PRESSURE: 110 MMHG | TEMPERATURE: 97.8 F

## 2023-07-06 DIAGNOSIS — Z34.01 ENCOUNTER FOR SUPERVISION OF NORMAL FIRST PREGNANCY IN FIRST TRIMESTER: Primary | ICD-10-CM

## 2023-07-06 DIAGNOSIS — Z86.39 HISTORY OF GRAVES' DISEASE: ICD-10-CM

## 2023-07-06 LAB — TSH SERPL DL<=0.005 MIU/L-ACNC: 3.54 UIU/ML (ref 0.3–4.2)

## 2023-07-06 PROCEDURE — 99207 PR PRENATAL VISIT: CPT | Performed by: FAMILY MEDICINE

## 2023-07-06 PROCEDURE — 36415 COLL VENOUS BLD VENIPUNCTURE: CPT | Performed by: FAMILY MEDICINE

## 2023-07-06 PROCEDURE — 84443 ASSAY THYROID STIM HORMONE: CPT | Performed by: FAMILY MEDICINE

## 2023-07-06 NOTE — PROGRESS NOTES
Patient reports that overall she has been feeling well.  She reports very adequate fetal movement, denies leakage of fluid or bleeding.  She denies right upper quadrant pain, headaches or blurred vision.  She has no lower extremity edema.  We discussed kick counting in detail today along with very appropriate questions that the patient had about birth control, labor and delivery, standards of care, pain management, etc.  Plan recheck thyroid hormone level today as she has a history of Graves' disease.  Plan Tdap vaccine next visit.  Concerns: None  Doing well.  No concerns today.  No vaginal bleeding, loss of fluid, or contractions  Discussed kick counts and fetal movement.  Reportable signs and symptoms discussed.  Discussed PTL, PROM, and when to call or come in.  RTC in 2 weeks.      Michelle Moctezuma MD

## 2023-07-06 NOTE — PATIENT INSTRUCTIONS
Kick Counts  It s normal to worry about your baby s health. Generally, you will feel your baby start to move in your 2nd trimester at around 16 to 24 weeks. Getting to know the pattern of your baby's movements is one way to know what's normal for you and baby. This is called a kick count. Talk with your healthcare provider about kick counts and your specific situation. Always follow your provider's instructions.   How to count kicks    Here is just one way to do kick counts. Always follow your healthcare provider's instructions. Starting at 28 weeks, count your baby's movements daily. Time how long it takes you to feel 10 kicks, flutters, swishes, or rolls. Ideally, you want to feel at least 10 movements in 2 hours. You will likely feel 10 movements in less time than that.   Here are tips for counting kicks:     Choose a time when the baby is active, such as after a meal.     Sit comfortably or lie on your side.     The first time the baby moves, write down the time.     Count each movement until the baby has moved  10 times. This can take from 20 minutes to 2 hours.     If you haven't felt 10 kicks by the end of the second hour, wait a few hours. Then try again.    Try to do it at the same time each day.  When to call your healthcare provider  Follow your provider's instructions about when to call about your baby's movements. Don't hesitate to call if you have concerns.   Call your healthcare provider  right away if:     You do a couple sets of kick counts during the day and your baby moves fewer than 10 times in 2 hours.    Your baby moves much less often than on the days before.    You haven't felt your baby move all day.  [employer] last reviewed this educational content on 12/1/2022 2000-2023 [employer]. All rights reserved. This information is not intended as a substitute for professional medical care. Always follow your healthcare professional's instructions.        Counting Your Baby's  Movements   Counting your unborn baby's movements will assure you of your baby's health.   The best time to count is when your baby is most active. Do it at the same time every day.  To keep track of your baby's movements, use the attached chart. Bring the chart with you to each clinic visit.  1. Do not smoke for at least 2 hours before counting your baby's movements. (In fact, it's best to quit smoking if you're pregnant.)  2. Lie on your side. Put your hand on your baby.  3. Write the time you start counting movements.  4. Count the next 10 kicks, rolls, and other movements.  5. Write the time when your baby has finished 10 movements.  6. If you reach 10 movements within 2 hours, you're done for the day.  Call your care provider right away if:    You feel no movement for the first hour.    It takes more than 2 hours to feel 10 movements.    There's a change in the normal pattern of movements.  Your care provider's phone number is:   ________________________________________  The number to your [facility] center is:   ________________________________________     For informational purposes only. Not to replace the advice of your health care provider. Copyright   ,  [facility]. All rights reserved. Clinically reviewed by Lexie Cheney RN, [facility]. [employer] [id number] - REV 10/21.       Understanding  Labor  Going into labor before week 37 of pregnancy is called  labor.  labor can cause your baby to be born too soon. This can lead to health problems for your baby.     Before labor, the cervix is thick and closed.      In  labor, the cervix begins to efface (thin) and dilate (open).     Symptoms of  labor  If you think you re having  labor, get medical help right away. Contractions alone don t mean you re in  labor. What matters more are changes in your cervix. The cervix is the opening at the lower end of the uterus. Symptoms  of  labor include:    4 or more contractions per hour    Strong contractions    Constant menstrual-like cramping    Low-back pain    Mucous or bloody fluid from the vagina    Bleeding or spotting in the second or third trimester  Evaluating  labor  Your healthcare provider will try to find out if you re in  labor or just having contractions. They may watch you for a few hours. You may have these tests:    Pelvic exam. This is to see if your cervix has effaced (thinned) and dilated (opened).    Uterine activity monitoring. This is used to detect contractions.    Fetal monitoring. This is done to check the health of your baby.    Ultrasound. This test looks at your baby s size and position.    Amniocentesis. This test checks how mature your baby s lungs are.  Caring for yourself at home  If you have  contractions, but your cervix is still thick and closed, your healthcare provider may tell you to:    Drink plenty of water.    Do fewer activities.    Rest in bed on your side.    Don't have intercourse or stimulate your nipples.  When to call your healthcare provider  Call your healthcare provider if you have any of these:    4 or more contractions per hour    Bag of water breaks    Bleeding or spotting  If you need hospital care   labor often means that you need hospital care. You may need complete bed rest. You may have an IV (intravenous) line in your arm or hand. This is to give you fluids. You may be given pills or injections. These are done to help prevent contractions. You may get a medicine called a corticosteroid. This is to help your baby s lungs mature more quickly.  Are you at risk?  Any pregnant woman can have  labor. It may start for no reason. But these risk factors can increase your chances:    Past  labor or early birth    Smoking, drug, or alcohol use in pregnancy    A multiple pregnancy (twins or more)    Problems with the shape of the  "uterus    Bleeding during the pregnancy  The dangers of  birth  A baby born too soon may have health problems. This is because the baby didn t have enough time to grow. Some of the risks for your baby include:    Not breastfeeding or feeding well    Having immature lungs    Bleeding in the brain    Death  Reaching term  Your goal is to get as close to term (week 37 or later) as you can before giving birth. The closer you get to term, the higher your chance of having a healthy baby. Work with your healthcare provider. Together, you can take steps that may keep you from giving birth too early.  [employer] last reviewed this educational content on 10/1/2021    0732-8300 [employer]. All rights reserved. This information is not intended as a substitute for professional medical care. Always follow your healthcare professional's instructions.          Using Nitrous Oxide During Labor  What is nitrous oxide?  Nitrous oxide is a mixture of 50% nitrous oxide gas and 50% oxygen that is inhaled through a mask. Nitrous oxide is better known for use in dental offices or before surgery to help patients relax. Most people know of it as \"laughing gas.\"   How do I use it during labor?  You hold your own mask and begin to inhale the gas mixture about 30 seconds before a contraction begins. Breathing before and during a contraction helps the gas work the best right at the peak of the contraction, providing the greatest relief. It may take 3 to 4 contractions to get used to the rhythm of breathing the nitrous oxide.   Does nitrous oxide have any side effects?  Some women have reported dizziness, feeling sleepy, dry mouth and nausea (feeling sick to your stomach) with use of nitrous oxide. These side effects often decrease over time. Medicine is available to help ease nausea if it is a concern for you.   Is any extra monitoring required for me or my baby?  No. Your monitoring will not need to change just because you " are using nitrous oxide.   Can I still be out of bed and use nitrous oxide?  Yes. Women sometimes feel dizzy when using nitrous oxide. For this reason, you will begin using nitrous oxide while in a bed or chair. If you feel okay, you may get up and walk or use a birthing ball or stool. It will be important that you have someone in the room close by for support.   Can I use nitrous oxide and have IV pain medicines at the same time?  No. The combination of narcotics (injectable pain medications) and nitrous oxide can slow your breathing, so it is not safe for them to be used together. You may use nitrous oxide before receiving an epidural or IV pain medication.  If I use nitrous oxide can I still get an epidural?  Yes. You may wish to use nitrous oxide until you are ready for an epidural. Nitrous oxide can be less effective than an epidural in reducing labor pain. If an epidural is part of your birth plan, it is important that you get your epidural while you are still able to sit for safe placement.   Are there reasons I couldn't use nitrous oxide?   Yes. You cannot use nitrous oxide if you:    Cannot hold your own face mask.    Have received a dose of narcotic in the past few hours (your nurse will discuss this with you).    Have a documented B12 deficiency.    Have one of a very few other rare medical conditions.  Can my labor support person help me with my nitrous oxide?  No! Only you can administer nitrous oxide to yourself. Part of the built-in safety of nitrous oxide is that you hold your own mask. If anyone in the room is found to be handling the nitrous oxide equipment other than you or your nurse, the nitrous oxide will be removed.  Can I use nitrous oxide with other procedures?   Yes. If nitrous oxide is right for you and your provider agrees, it may be used in these situations:    During the repair of a laceration or episiotomy    Manual removal of your placenta    Blood draws    IV placement  For  informational purposes only. Not to replace the advice of your health care provider. Copyright   2019 [facility]. All rights reserved. Clinically reviewed by  [employer] Team. [employer] 157909 - Rev .          Pregnancy: Your Third Trimester Changes  As the baby grows, your body changes, too. You may also see signs that your body is getting ready for labor. Be patient. Within a few more weeks, your baby will be born.   How you are changing  Your body is preparing for the birth of your baby. Some of the most common changes are listed below. If you have any questions or concerns, ask your healthcare provider:     You ll gain more weight from fluids, extra blood, and fat deposits.    Your breasts will grow as your body gets ready to feed the baby. They may be more tender. You may also notice a slight yellow or white discharge from the nipples.    Discharge from your vagina may increase. This is normal.    You might see some skin color changes on your forehead, cheeks, or nose. Most of these will go away after you deliver.  How your baby is growing  Month 7  Your baby can open and close their eyes and weighs around 4 pounds (1.8 kg). If born prematurely (too early), your baby would likely survive with special care.     Month 8  Your baby is building up body fat and weighs around 6 pounds (2.7 kg).    Month 9  Your baby weighs nearly 7 pounds (3.2 kg) and is about 19 to 21 inches long. In other words, any day now...     [employer] last reviewed this educational content on 2021-2023 [employer]. All rights reserved. This information is not intended as a substitute for professional medical care. Always follow your healthcare professional's instructions.

## 2023-07-20 ENCOUNTER — PRENATAL OFFICE VISIT (OUTPATIENT)
Dept: FAMILY MEDICINE | Facility: CLINIC | Age: 33
End: 2023-07-20
Payer: COMMERCIAL

## 2023-07-20 VITALS
TEMPERATURE: 98.1 F | DIASTOLIC BLOOD PRESSURE: 66 MMHG | SYSTOLIC BLOOD PRESSURE: 105 MMHG | RESPIRATION RATE: 16 BRPM | BODY MASS INDEX: 25.52 KG/M2 | WEIGHT: 144 LBS | HEART RATE: 79 BPM | HEIGHT: 63 IN

## 2023-07-20 DIAGNOSIS — Z34.01 ENCOUNTER FOR SUPERVISION OF NORMAL FIRST PREGNANCY IN FIRST TRIMESTER: Primary | ICD-10-CM

## 2023-07-20 PROCEDURE — 99207 PR PRENATAL VISIT: CPT | Performed by: FAMILY MEDICINE

## 2023-07-20 PROCEDURE — 90471 IMMUNIZATION ADMIN: CPT | Performed by: FAMILY MEDICINE

## 2023-07-20 PROCEDURE — 90715 TDAP VACCINE 7 YRS/> IM: CPT | Performed by: FAMILY MEDICINE

## 2023-07-20 NOTE — PATIENT INSTRUCTIONS
"  Weeks 30 to 32 of Your Pregnancy: Care Instructions  Your baby is growing more every day. Its eyes can open and close, and it may have hair on its head. Your baby may sleep 20 to 45 minutes at a time and is more active at certain times.    You should feel your baby move several times every day. Your baby now turns less and kicks more.   This is a good time to tour your hospital or birthing center. You may also want to find childcare if needed.     To ease heartburn      Avoid foods that make your symptoms worse, such as chocolate, spicy foods, and caffeine.    Avoid bending over or lying down after meals.    Do not eat for 2 hours before bedtime.    Take antacids like Tums, but don't take ones that have sodium bicarbonate, magnesium trisilicate, or aspirin.    To care for large, swollen veins (varicose veins)      Try to avoid standing for long periods of time.    Sit with your feet propped up.    Wear support hose.    Get some exercise every day, like walking or swimming.  Counting your baby's kicks  Your doctor may ask you to count your baby's movements, such as kicks, flutters, or rolls.    Find a quiet place, and get comfortable. Write down your start time. Count your baby's movements (except hiccups). When your baby has moved 10 times, you can stop counting. Write down how many minutes it took.   If an hour goes by and you don't feel 10 movements, have something to eat or drink. Count for another hour. If you don't feel at least 10 movements in the 2-hour period, call your doctor.   Follow-up care is a key part of your treatment and safety. Be sure to make and go to all appointments, and call your doctor if you are having problems. It's also a good idea to know your test results and keep a list of the medicines you take.  Where can you learn more?  Go to https://www.Biocontrolwise.net/patiented  Enter X471 in the search box to learn more about \"Weeks 30 to 32 of Your Pregnancy: Care Instructions.\"  Current as of: " November 9, 2022               Content Version: 13.7    7394-5532 [employer].   Care instructions adapted under license by your healthcare professional. If you have questions about a medical condition or this instruction, always ask your healthcare professional. [employer] disclaims any warranty or liability for your use of this information.    Learning About Birth Control After Childbirth  What is birth control?     Birth control (contraception) is any method used to prevent pregnancy.  Wait until you're healed before you have sexual intercourse. This takes about 4 to 6 weeks. If you have sex without birth control, there is a chance that you could get pregnant. This is true even if you haven't started having periods again. Even if you breastfeed, you can still get pregnant.  Most experts suggest waiting at least 18 months to get pregnant again. This can reduce risks for you and the baby. There may be reasons for you to get pregnant sooner, though. So talk with your doctor about the risks and benefits.  The only sure way to not get pregnant is to not have sex. But finding a good method of birth control that you're comfortable with can help you avoid an unplanned pregnancy. Your doctor can help you choose the birth control method that's right for you.  What are the types of birth control?    Long-acting reversible contraception (LARC). This is the most effective reversible method you can use to prevent pregnancy. LARCs are implants and intrauterine devices (IUDs). While they are being used, they usually prevent pregnancy for years. If you decide you want to get pregnant, you can have them removed.  ? Implants are placed under the skin of the arm. This can be done right after you give birth. They release the hormone progestin. The implant prevents pregnancy for up to 5 years. Talk to your doctor about how long you can use it.  ? IUDs are placed in the uterus by a doctor. This can be done  "right after you give birth, if you and your doctor discuss it beforehand. Or it can be done at a doctor visit later. There are two main types of IUDs--the copper IUD and the hormonal IUD. The hormonal IUD releases progestin. IUDs prevent pregnancy for 3 to 12 years, depending on the type. Talk to your doctor about how long you can use it.    Hormonal methods. They are very good at preventing pregnancy. Combination birth control pills (\"the pill\"), skin patches, and vaginal rings release the hormones estrogen and progestin. Depo-Provera is a shot you get every 3 months. Shots, mini-pills, IUDs, and implants release progestin only. It's best to use progestin-only options in the first few weeks after you give birth.    Barrier methods. These don't prevent pregnancy as well as implants, IUDs, or hormonal methods do. Barrier methods include condoms, diaphragms, and cervical caps. You must use them every time you have sex. If you had a diaphragm or cervical cap before you got pregnant, talk to your doctor to see if you need a different size. Condoms can be used anytime after you give birth.    Natural family planning. This is also known as fertility awareness or the rhythm method. It can work if you and your partner are careful and you have a regular ovulation cycle. But it doesn't work better than other birth control methods. You will need to keep records so you know when you are most likely to become pregnant. And during those times, you will need to use a barrier method or not have sex.    Permanent birth control (sterilization). It gives you lasting protection against pregnancy. A man can have a vasectomy. A woman can have her tubes tied (tubal ligation). But this is only a good choice if you are sure that you don't want any more children.    Emergency contraception. This is a backup method to prevent pregnancy if you didn't use birth control or if a condom breaks. The most effective emergency contraception is an IUD " "(inserted by a doctor). You can also get emergency contraceptive pills. You can get them with a prescription from your doctor or without a prescription at most drugstores.  How can you get birth control?    You can buy:  ? Condoms and spermicides without a prescription. You can get them in drugstores, online, and in many grocery stores.  ? Some forms of emergency contraception without a prescription. You can get these at most drugstores.    You need to see a doctor or visit family planning clinic to:  ? Get a prescription for birth control pills and other methods that use hormones.  ? Have an implant or IUD inserted. This includes the type of IUD used for emergency contraception.  ? Get a hormone shot.  ? Get a prescription for a diaphragm or cervical cap.  ? Get a prescription for certain kinds of emergency contraception.  Follow-up care is a key part of your treatment and safety. Be sure to make and go to all appointments, and call your doctor if you are having problems. It's also a good idea to know your test results and keep a list of the medicines you take.  Where can you learn more?  Go to https://www.to be.net/patiented  Enter X408 in the search box to learn more about \"Learning About Birth Control After Childbirth.\"  Current as of: August 2, 2022               Content Version: 13.7 2006-2023 [employer].   Care instructions adapted under license by your healthcare professional. If you have questions about a medical condition or this instruction, always ask your healthcare professional. [employer] disclaims any warranty or liability for your use of this information.      Birth Control Methods  Birth control methods are used to help prevent pregnancy. There are many different methods to choose from. Talk with your healthcare provider about which method is right for you. Be sure to ask your provider how well each one works. Also ask about the benefits, risks, and side effects " of each method.   Hormones  Some birth control methods work by releasing hormones such as progestin and estrogen. These methods include hormone implants, hormone shots, the vaginal ring, the patch, and birth control pills. They all work by stopping the release of the egg from the ovary (ovulation). All of these methods work well and can be stopped at any time.     The implant is a small device that needs to be placed in the upper arm by a trained healthcare provider. It works for up to 3 years.    Hormone injections must be repeated every 3 months.    The vaginal ring must be replaced monthly. It can be removed during the fourth week of each cycle.    The patch must be replaced weekly. It's not worn during the fourth week of each cycle.    Birth control pills must be taken every day.  Intrauterine device (IUD)  An IUD is a small, T-shaped device. It must be placed in the uterus by a trained healthcare provider. There are different types of IUDs available. They work by causing changes in the uterus that make it harder for sperm to reach the egg. Depending on the type of IUD you have, it may work for several years or longer. The IUD is a reversible birth control method. This means it can be removed at any time.   Condom  A condom is a sheath that forms a thin barrier between the penis and the vagina. It helps prevent pregnancy by keeping sperm from entering the vagina. When latex condoms are used, they have the added benefit of protecting against most STIs (sexually transmitted infections). Condoms are used each time there is sexual intercourse and should be discarded after each use. Ask your healthcare provider about the different types of condoms available. These include both the male condom and female condom.   Spermicide  Spermicides come as foams, jellies, creams, suppositories, and tablets.  They help prevent pregnancy by killing sperm. When used alone they are not that reliable. They work best when combined  with other birth control methods such as diaphragms and cervical caps.   Sponge, diaphragm, and cervical cap   All of these methods help prevent pregnancy by covering the opening of the uterus (cervix). This prevents sperm from passing through.   The sponge contains spermicide. It can be bought over the counter. The sponge must be left in place for at least 6 hours after the last time you have sex. However, it should not stay in place for more than 24 hours. Discard after use.   The diaphragm and cervical cap must be fitted and prescribed by your healthcare provider. Both are used with spermicide. The diaphragm must be left in place for at least 6 hours after sex. However, it should not stay in place for more than 24 hours. It can be washed and reused. The cervical cap must be left in place for at least 6 hours after sex. However, it should not stay in place for more than 48 hours. It can be washed and reused.   Withdrawal method  This is when the man pulls his penis out of the vagina just before ejaculation ( coming ). This lowers the amount of sperm entering the vagina. Be aware that fluids released just before ejaculation often still contain some sperm, so this method is not as reliable as certain other methods.   Rhythm method   This method is also call natural family planning or fertility awareness. It requires that you know when in your menstrual cycle you are likely to become pregnant. Then you not have sex during those days. This requires careful planning and good discipline. Your healthcare provider can explain more about how this works.   Tubal ligation and vasectomy  These are surgical methods to prevent pregnancy. Tubal ligation is an option for women. The fallopian tubes are blocked or cut (ligated). This keeps the egg from passing into the uterus or sperm from reaching the egg. Vasectomy is an option for men. The tubes that normally carry sperm to the penis are either closed or blocked. Both tubal  ligation and vasectomy are permanent birth control methods. This means reversal is either not possible or unlikely to work. They are good choices for women and men who know that they don't want to have children in the future.   [employer] last reviewed this educational content on 7/1/2020 2000-2022 [employer]. All rights reserved. This information is not intended as a substitute for professional medical care. Always follow your healthcare professional's instructions.

## 2023-07-20 NOTE — PROGRESS NOTES
Patient feels well with the exception of some midline thoracic back pain and heartburn.  In terms of her back, this is not radiating, is central, and comes and goes.  Discussed core strengthening exercises, massage, chiropractic care.  For heartburn, this occurs 3-4 times weekly and she treats with Tums as needed.  Discussed avoiding foods that exacerbate heartburn and gave some examples today, recommended H2 blocker at bedtime if this continues or becomes more frequent.  Tdap updated today and breast pump Rx given.  Baby measures slightly small for dates today.  Discussed the option of a growth ultrasound, will see how baby measures next visit and order if continuing to measure small.    Concerns: back pain, heartburn  No vaginal bleeding, LOF.  No contractions.  Cephalic position confirmed by Leopold maneuvers.  Discussed kick counts and fetal movement.  Reportable signs and symptoms discussed.  Discussed PTL, PROM, and when to call or come in.  RTC 2 weeks.    Michelle Moctezuma MD

## 2023-08-03 ENCOUNTER — PRENATAL OFFICE VISIT (OUTPATIENT)
Dept: FAMILY MEDICINE | Facility: CLINIC | Age: 33
End: 2023-08-03
Payer: COMMERCIAL

## 2023-08-03 VITALS
HEART RATE: 92 BPM | RESPIRATION RATE: 16 BRPM | TEMPERATURE: 97.9 F | DIASTOLIC BLOOD PRESSURE: 59 MMHG | SYSTOLIC BLOOD PRESSURE: 106 MMHG | BODY MASS INDEX: 26.89 KG/M2 | WEIGHT: 149.4 LBS | OXYGEN SATURATION: 98 %

## 2023-08-03 DIAGNOSIS — Z34.01 ENCOUNTER FOR SUPERVISION OF NORMAL FIRST PREGNANCY IN FIRST TRIMESTER: Primary | ICD-10-CM

## 2023-08-03 PROCEDURE — 99207 PR PRENATAL VISIT: CPT | Performed by: FAMILY MEDICINE

## 2023-08-03 NOTE — PROGRESS NOTES
Patient reports that overall she has been feeling well.  She notes less heartburn and less back pain.  She has been modifying her diet and trying not to eat late in the evening.  She reports good fetal movement, denies LOF or bleeding.  Delivery preferences discussed today, patient is open to epidural for pain control during labor.  She continues to measure slightly small for dates, growth ultrasound discussed and ordered.    Concerns: None  Doing well.  No concerns today.  No vaginal bleeding, LOF.  No contractions.  Cephalic position confirmed by Leopold maneuvers.  Discussed kick counts and fetal movement.  Reportable signs and symptoms discussed.  Discussed PTL, PROM, and when to call or come in.  RTC 2 weeks.    Michelle Moctezuma MD

## 2023-08-03 NOTE — PATIENT INSTRUCTIONS
"You can call [phone] to schedule your ultrasound at your convenience.      Weeks 32 to 34 of Your Pregnancy: Care Instructions    Decide whether you want to bank or donate your baby's umbilical cord blood. If you want to save this blood, you have to arrange for it ahead of time.   Decide about circumcision. Personal, Advent, or cultural beliefs may play a role in your decision. You get to decide what you want for your baby.     Learn how to ease hemorrhoids.    Get more liquids, fruits, vegetables, and fiber in your diet.  Avoid sitting for too long.  Clean yourself with moist toilet paper. Or try witch hazel pads.  Try ice packs or warm sitz baths for discomfort.  Use hydrocortisone cream for pain or itching.  Ask your doctor about stool softeners.    Consider the benefits of breastfeeding.    It reduces your baby's risk of sudden infant death syndrome (SIDS).   babies are less likely to get certain infections. And they're less likely to be obese or get diabetes later in life.  It can lower your risk of breast and ovarian cancers and osteoporosis.  It saves you money.  Follow-up care is a key part of your treatment and safety. Be sure to make and go to all appointments, and call your doctor if you are having problems. It's also a good idea to know your test results and keep a list of the medicines you take.  Where can you learn more?  Go to https://www.[email or web address]/patiented  Enter X711 in the search box to learn more about \"Weeks 32 to 34 of Your Pregnancy: Care Instructions.\"  Current as of: November 9, 2022               Content Version: 13.7    2467-6456 [employer].   Care instructions adapted under license by your healthcare professional. If you have questions about a medical condition or this instruction, always ask your healthcare professional. [employer] disclaims any warranty or liability for your use of this information.      You have been provided the Any " Day Now: Early Labor at Home document.    Additional copies can be found here:  [email or web address]  You have been provided the What I'd Wish I'd Known About Giving Birth document.    Additional copies can be found here:  [email or web address]

## 2023-08-10 ENCOUNTER — HOSPITAL ENCOUNTER (OUTPATIENT)
Dept: ULTRASOUND IMAGING | Facility: HOSPITAL | Age: 33
Discharge: HOME | End: 2023-08-10
Attending: FAMILY MEDICINE | Admitting: FAMILY MEDICINE
Payer: COMMERCIAL

## 2023-08-10 DIAGNOSIS — Z34.01 ENCOUNTER FOR SUPERVISION OF NORMAL FIRST PREGNANCY IN FIRST TRIMESTER: ICD-10-CM

## 2023-08-10 PROCEDURE — 76816 OB US FOLLOW-UP PER FETUS: CPT

## 2023-08-14 ENCOUNTER — NURSE TRIAGE (OUTPATIENT)
Dept: FAMILY MEDICINE | Facility: CLINIC | Age: 33
End: 2023-08-14
Payer: COMMERCIAL

## 2023-08-14 NOTE — TELEPHONE ENCOUNTER
Nurse Triage SBAR    Is this a 2nd Level Triage? NO    Situation:   Patient calling to report that she fever & muscle aches.    Background:   Currently 33 wks pregnant.    Assessment:   Reports that she was experiencing some muscle aches earlier this am but was not sure if it was just pregnancy related. Took her temperature about 15 mins prior to calling & oral temp was 100.1. Denies any chills. Home covid test was negative. Denies any headache. No breathing difficulty. Denies contractions other than simran quiroz. Normal fetal movement.    Protocol Recommended Disposition:   Home Care    Recommendation:   RN recommended home care. Gave care advice. Advised patient to call back if sxs worsen. Patient would like PCP to be informed & if PCP has any further recommendations. Has upcoming OB appt on 8/17. Will route to PCP to advise.    Routed to provider    Does the patient meet one of the following criteria for ADS visit consideration? 16+ years old, with an [facility] PCP     TIP  Providers, please consider if this condition is appropriate for management at one of our Acute and Diagnostic Services sites.     If patient is a good candidate, please use dotphrase <dot>triageresponse and select Refer to ADS to document.     PITER Mcmahon, RN  [facility]    Reason for Disposition   Fever with no signs of serious infection AND no localizing symptoms  (all other triage questions negative)    Additional Information   Negative: Shock suspected (e.g., cold/pale/clammy skin, too weak to stand, low BP, rapid pulse)   Negative: Difficult to awaken or acting confused (e.g., disoriented, slurred speech)   Negative: Rash with purple (or blood-colored) spots or dots   Negative: Sounds like a life-threatening emergency to the triager   Negative: Abdominal pain and pregnant 20 or more weeks   Negative: Abdominal pain and pregnant < 20 weeks   Negative: Fever onset within 24 hours of receiving vaccine   Negative:  "Fever onset within 14 days of COVID-19 Exposure   Negative: Other symptom is present, see that guideline (e.g., sore throat, earache, diarrhea, vomiting)   Negative: Headache and stiff neck (can't touch chin to chest)   Negative: Difficulty breathing   Negative: IV Drug Use (IVDU)   Negative: Having contractions or other symptoms of labor   Negative: Leakage of fluid from vagina   Negative: Pregnant 23 or more weeks and baby is moving less today (e.g., kick count < 5 in 1 hour or < 10 in 2 hours)   Negative: Fever > 104 F (40 C)   Negative: Fever > 100.0 F (37.8 C) and indwelling urinary catheter (e.g., Wilson, coude)   Negative: Fever > 100.0 F (37.8 C) and diabetes mellitus or weak immune system (e.g., HIV positive, cancer chemo, splenectomy, organ transplant, chronic steroids)   Negative: Painful urination or increased frequency of urination   Negative: Drinking very little and dehydration suspected (e.g., no urine > 12 hours, very dry mouth, very lightheaded)   Negative: Patient sounds very sick or weak to the triager   Negative: Severe chills (i.e., feeling extremely cold WITH shaking chills)   Negative: Fever > 100.0 F (37.8 C) and foreign travel to a developing country in the last month   Negative: Fever > 100.4 F (38.0 C) and NO cold symptoms (e.g., runny nose, cough)   Negative: Fever > 100.4 F (38.0 C) with cold symptoms and lasts > 3 days   Negative: Patient wants to be seen    Answer Assessment - Initial Assessment Questions  1. TEMPERATURE: \"What is the most recent temperature?\"  \"How was it measured?\"       100.1 orally  2. ONSET: \"When did the fever start?\"       Fever about 15 mins ago  3. CHILLS: \"Do you have chills?\" If yes: \"How bad are they?\"  (e.g., none, mild, moderate, severe)    - NONE: no chills    - MILD: feeling cold    - MODERATE: feeling very cold, some shivering (feels better under a thick blanket)    - SEVERE: feeling extremely cold with shaking chills (general body shaking, rigors; " "even under a thick blanket)       No  4. OTHER SYMPTOMS: \"Do you have any other symptoms besides the fever?\"  (e.g., abdomen pain, cough, diarrhea, earache, headache, sore throat, urination pain)      No  5. CAUSE: If there are no symptoms, ask: \"What do you think is causing the fever?\"       Unsure  6. CONTACTS: \"Does anyone else in the family have an infection?\"      No  7. TREATMENT: \"What have you done so far to treat this fever?\" (e.g., medications)      No  8. IMMUNOCOMPROMISE: \"Do you have of the following: diabetes, HIV positive, splenectomy, chronic steroid treatment, transplant patient, etc.\"      No  9. OTHER SYMPTOMS: \"Do you have any other symptoms?\" (e.g., abdominal pain, fever, vaginal   bleeding, decreased fetal movement)      No  10. CHRIS: \"What date are you expecting to deliver?        9/26/23  11. TRAVEL: \"Have you traveled out of the country in the last month?\" (e.g., travel history, exposures)        No    Protocols used: Pregnancy - Fever-A-OH    "

## 2023-08-15 ENCOUNTER — E-VISIT (OUTPATIENT)
Dept: FAMILY MEDICINE | Facility: CLINIC | Age: 33
End: 2023-08-15
Payer: COMMERCIAL

## 2023-08-15 DIAGNOSIS — Z20.822 SUSPECTED COVID-19 VIRUS INFECTION: Primary | ICD-10-CM

## 2023-08-15 PROCEDURE — 99421 OL DIG E/M SVC 5-10 MIN: CPT | Performed by: FAMILY MEDICINE

## 2023-08-15 NOTE — PATIENT INSTRUCTIONS
Dear Rubi,      Based on your responses, you may have COVID-19.     Will I be tested for COVID-19?  We would like to test you for COVID. I have placed orders for this test.     To schedule: go to your [employer] home page and scroll down to the section that says  You have an appointment that needs to be scheduled  and click the large green button that says  Schedule Now  and follow the steps to find the next available openings.    If you are unable to complete these [employer] scheduling steps, please call [phone] to schedule your testing.     How do I self-isolate?  You isolate when you have symptoms of COVID or a test shows you have COVID, even if you don t have symptoms.   If you DO have symptoms:  Stay home and away from others  For at least 5 days after your symptoms started, AND   You are fever free for 24 hours (with no medicine that reduces fever), AND  Your other symptoms are better.  Wear a mask for 10 full days any time you are around others.  If you DON T have symptoms:  Stay at home and away from others for at least 5 days after your positive test.  Wear a mask for 10 full days any time you are around others.    How can I take care of myself?  Over the counter medications may help with your symptoms such as runny or stuffy nose, cough, chills, or fever.  Talk to your care team about your options.     Some people are at high risk of severe illness (for example, you have a weak immune system, you re 50 years or older, or you have certain medical problems). If your risk is high and your symptoms started in the last 5 days, we strongly recommend for you to get COVID treatment as soon as possible.There are safe and effective medicines available that can make you feel better faster, and prevent hospitalization and death.       To discuss COVID treatment you can:  Call your clinic OR [phone] ([phone]) and ask to speak to a nurse about a positive COVID test.  Send a [employer] message to your care  "team. In [employer], select \"Ask a Medical Question\" then \"Do I need an appointment\" and put \"COVID\" in the subject line. Please include a phone number to call you back in the message.             Get lots of rest. Drink extra fluids (unless a doctor has told you not to)  Take Tylenol (acetaminophen) or ibuprofen for fever or pain. If you have liver or kidney problems, ask your family doctor if it's okay to take Tylenol or ibuprofen  Take over the counter medications for your symptoms, as directed by your doctor. You may also talk to your pharmacist.    If you have other health problems (like cancer, heart failure, an organ transplant or severe kidney disease): Call your specialty clinic if you don't feel better in the next 2 days.  Know when to call 911. Emergency warning signs include:  Trouble breathing or shortness of breath  Pain or pressure in the chest that doesn't go away  Feeling confused like you haven't felt before, or not being able to wake up  Bluish-colored lips or face    Where can I get more information?  [facility] - About COVID-19: [email or web address]   CDC - What to Do If You're Sick: https://www.cdc.gov/coronavirus/2019-ncov/if-you-are-sick/index.html   CDC -  Isolation https://www.cdc.gov/coronavirus/2019-ncov/your-health/isolation.html    August 15, 2023  RE:  Rubi Mehta                                                                                                                  [address] [facility] [address] [facility] [address]      To whom it may concern:    I evaluated Rubi Mehta on August 15, 2023. Rubi Mehta should be excused from work/school.     They should let their workplace manager and staffing office know when their quarantine ends.    We can not give an exact date as it depends on the information below. They can calculate this on their own or work with their manager/staffing office to calculate this. (For example if they were exposed on 10/04, they would have " to quarantine for 14 full days. That would be through 10/18. They could return on 10/19.)    Quarantine Guidelines:    If patient receives a positive COVID-19 test result, they should follow the guidance of those who are giving the results. Usually the return to work is 10 (or in some cases 20 days from symptom onset.) If they work at [employer], they must be cleared by Employee Occupational Health and Safety to return to work.      If patient receives a negative COVID-19 test result and did not have a high risk exposure to someone with a known positive COVID-19 test, they can return to work once they're free of fever for 24 hours without fever-reducing medication and their symptoms are improving or resolved.    If patient receives a negative COVID-19 test and if they had a high risk exposure to someone who has tested positive for COVID-19 then they can return to work 14 days after their last contact with the positive individual    Note: If there is ongoing exposure to the covid positive person, this quarantine period may be longer than 14 days. (For example, if they are continually exposed to their child, who tested positive and cannot isolate from them, then the quarantine of 7-14 days can't start until their child is no longer contagious. This is typically 10 days from onset to the child's symptoms. So the total duration may be 17-24 days in this case.)     Sincerely,  Michelle Moctezuma MD

## 2023-08-15 NOTE — TELEPHONE ENCOUNTER
Called and spoke with patient, tylenol has been helping with the muscle aches. Patient states that her temp did get up to 101.7 last night but came down with tylenol and has stayed between 99.0-100.0 since then. Patient will submit an E-visit for PCR testing. Advised patient to call back with any worsening symptoms.    Janice Luo RN

## 2023-08-15 NOTE — TELEPHONE ENCOUNTER
Ok to take Tylenol, avoid ibuprofen for body aches.  I would recommend taking another COVID test in 48 hours, or she can submit an e-visit and I can order a COVID test through the clinic if she prefers.

## 2023-08-16 ENCOUNTER — LAB (OUTPATIENT)
Dept: LAB | Facility: CLINIC | Age: 33
End: 2023-08-16
Attending: FAMILY MEDICINE
Payer: COMMERCIAL

## 2023-08-16 DIAGNOSIS — Z20.822 SUSPECTED COVID-19 VIRUS INFECTION: ICD-10-CM

## 2023-08-16 PROCEDURE — 87635 SARS-COV-2 COVID-19 AMP PRB: CPT

## 2023-08-17 ENCOUNTER — PRENATAL OFFICE VISIT (OUTPATIENT)
Dept: FAMILY MEDICINE | Facility: CLINIC | Age: 33
End: 2023-08-17
Payer: COMMERCIAL

## 2023-08-17 VITALS
SYSTOLIC BLOOD PRESSURE: 114 MMHG | WEIGHT: 149 LBS | HEIGHT: 63 IN | HEART RATE: 103 BPM | OXYGEN SATURATION: 96 % | DIASTOLIC BLOOD PRESSURE: 71 MMHG | RESPIRATION RATE: 20 BRPM | BODY MASS INDEX: 26.4 KG/M2 | TEMPERATURE: 98.8 F

## 2023-08-17 DIAGNOSIS — Z34.03 ENCOUNTER FOR SUPERVISION OF NORMAL FIRST PREGNANCY IN THIRD TRIMESTER: Primary | ICD-10-CM

## 2023-08-17 LAB — SARS-COV-2 RNA RESP QL NAA+PROBE: NEGATIVE

## 2023-08-17 PROCEDURE — 59426 ANTEPARTUM CARE ONLY: CPT | Performed by: FAMILY MEDICINE

## 2023-08-18 PROBLEM — Z34.03 ENCOUNTER FOR SUPERVISION OF NORMAL FIRST PREGNANCY IN THIRD TRIMESTER: Status: ACTIVE | Noted: 2023-03-20

## 2023-08-18 NOTE — PATIENT INSTRUCTIONS
If you feel that you are in labor, you can call directly to [facility] at [phone].    Weeks 34 to 36 of Your Pregnancy: Care Instructions  Your belly has grown quite large. It's almost time to give birth! Your baby's lungs are almost ready to breathe air. The skull bones are firm enough to protect your baby's head. But they're soft enough to move down through the birth canal.    You might be wondering what to expect during labor. Because each birth is different, there's no way to know exactly what childbirth will be like for you. Talk to your doctor or midwife about any concerns you have.   You'll probably have a test for group B streptococcus (GBS). GBS is bacteria that can live in the vagina and rectum. GBS can make your baby sick after birth. If you test positive, you'll get antibiotics during labor.     Choose what type of pain relief you would like during labor.  You can choose from a few types, including medicine and non-medicine options. You may want to use several types of pain relief.     Know how medicines can help with pain during labor.  Some medicines lower anxiety and help with some of the pain. Others make your lower body numb so that you will feel less pain.     Tell your doctor about your pain medicine choice.  Do this before you start labor or very early in your labor. You may be able to change your mind during labor.     Learn about the stages of labor.    The first stage includes the early (latent) and active phases of labor. Contractions start in early labor. During active labor, contractions get stronger, last longer, and happen more often. And the cervix opens more rapidly.  The second stage starts when you're ready to push. During this stage, your baby is born.  During the third stage, you'll usually have a few more contractions to push out the placenta.   Follow-up care is a key part of your treatment and safety. Be sure to make and go to all appointments, and call your  "doctor if you are having problems. It's also a good idea to know your test results and keep a list of the medicines you take.  Where can you learn more?  Go to https://www.[employer].net/patiented  Enter B912 in the search box to learn more about \"Weeks 34 to 36 of Your Pregnancy: Care Instructions.\"  Current as of: 2022               Content Version: 13.7    0413-1184 [employer].   Care instructions adapted under license by your healthcare professional. If you have questions about a medical condition or this instruction, always ask your healthcare professional. [employer] disclaims any warranty or liability for your use of this information.      Group B Strep During Pregnancy: Care Instructions  Overview     Group B strep infection is caused by a type of bacteria. It's a different kind of bacteria than the kind that causes strep throat.  You may have this kind of bacteria in your body. Sometimes it may cause an infection, but most of the time it doesn't make you sick or cause symptoms. But if you pass the bacteria to your baby during the birth, it can cause serious health problems for your baby.  If you have this bacteria in your body, you will get antibiotics when you are in labor. Antibiotics help prevent problems for a  baby.  After birth, doctors will watch and may test your baby. If your baby tests positive for Group B strep, your baby will get antibiotics.  If you plan to breastfeed your baby, don't worry. It will be safe to breastfeed.  Follow-up care is a key part of your treatment and safety. Be sure to make and go to all appointments, and call your doctor if you are having problems. It's also a good idea to know your test results and keep a list of the medicines you take.  How can you care for yourself at home?  If your doctor has prescribed antibiotics, take them as directed. Do not stop taking them just because you feel better. You need to take the full " "course of antibiotics.  Tell your doctor if you are allergic to any antibiotic.  If you go into labor, or your water breaks, go to the hospital. Your doctor will give you antibiotics to help protect your baby from infection.  Tell the doctors and nurses if you have an allergy to penicillin.  Tell the doctors and nurses at the hospital that you tested positive for group B strep.  When should you call for help?   Call your doctor now or seek immediate medical care if:    You have symptoms of a urinary tract infection. These may include:  Pain or burning when you urinate.  A frequent need to urinate without being able to pass much urine.  Pain in the flank, which is just below the rib cage and above the waist on either side of the back.  Blood in your urine.  A fever.     You think you are in labor or your water has broken.     You have pain in your belly or pelvis.   Watch closely for changes in your health, and be sure to contact your doctor if you have any problems.  Where can you learn more?  Go to https://www.[employer].Infinancials/patiented  Enter M001 in the search box to learn more about \"Group B Strep During Pregnancy: Care Instructions.\"  Current as of: October 31, 2022               Content Version: 13.7    6086-2730 [employer]   Care instructions adapted under license by your healthcare professional. If you have questions about a medical condition or this instruction, always ask your healthcare professional. [employer] disclaims any warranty or liability for your use of this information.      Circumcision in Infants: What to Expect at Home  Your Child's Recovery  After circumcision, your baby's penis may look red and swollen. It may have petroleum jelly and gauze on it. The gauze will likely come off when your baby urinates. Follow your doctor's directions about whether to put clean gauze back on your baby's penis or to leave the gauze off. If you need to remove gauze from the penis, " use warm water to soak the gauze and gently loosen it.  The doctor may have used a Plastibell device to do the circumcision. If so, your baby will have a plastic ring around the head of the penis. The ring should fall off by itself in 10 to 12 days.  A thin, yellow film may form over the area the day after the procedure. This is part of the normal healing process. It should go away in a few days.  Your baby may seem fussy while the area heals. It may hurt for your baby to urinate. This pain often gets better in 3 or 4 days. But it may last for up to 2 weeks.  Even though your baby's penis will likely start to feel better after 3 or 4 days, it may look worse. The penis often starts to look like it's getting better after about 7 to 10 days.  This care sheet gives you a general idea about how long it will take for your child to recover. But each child recovers at a different pace. Follow the steps below to help your child get better as quickly as possible.  How can you care for your child at home?  Activity    Let your baby rest as much as possible. Sleeping will help with recovery.     You can give your baby a sponge bath the day after surgery. Ask your doctor when it is okay to give your baby a bath.   Medicines    Your doctor will tell you if and when your child can restart any medicines. The doctor will also give you instructions about your child taking any new medicines.     Your doctor may recommend giving your baby acetaminophen (Tylenol) to help with pain after the procedure. Be safe with medicines. Give your child medicines exactly as prescribed. Call your doctor if you think your child is having a problem with a medicine.     Do not give your child two or more pain medicines at the same time unless the doctor told you to. Many pain medicines have acetaminophen, which is Tylenol. Too much acetaminophen (Tylenol) can be harmful.   Circumcision care    Always wash your hands before and after touching the  "circumcision area.     Gently wash your baby's penis with plain, warm water after each diaper change, and pat it dry. Do not use soap. Don't use hydrogen peroxide or alcohol. They can slow healing.     Do not try to remove the film that forms on the penis. The film will go away on its own.     Put plenty of petroleum jelly (such as Vaseline) on the circumcision area during each diaper change. This will prevent your baby's penis from sticking to the diaper while it heals.     Fasten your baby's diapers loosely so that there is less pressure on the penis while it heals.   Follow-up care is a key part of your child's treatment and safety. Be sure to make and go to all appointments, and call your doctor if your child is having problems. It's also a good idea to know your child's test results and keep a list of the medicines your child takes.  When should you call for help?   Call your doctor now or seek immediate medical care if:    Your baby has a fever over 100.4 F.     Your baby is extremely fussy or irritable, has a high-pitched cry, or refuses to eat.     Your baby does not have a wet diaper within 12 hours after the circumcision.     You find a spot of bleeding larger than a 2-inch [place] from the incision.     Your baby has signs of infection. Signs may include severe swelling; redness; a red streak on the shaft of the penis; or a thick, yellow discharge.   Watch closely for changes in your child's health, and be sure to contact your doctor if:    A Plastibell device was used for the circumcision and the ring has not fallen off after 10 to 12 days.   Where can you learn more?  Go to https://www.[employer].net/patiented  Enter S255 in the search box to learn more about \"Circumcision in Infants: What to Expect at Home.\"  Current as of: March 1, 2023               Content Version: 13.7    6054-7239 [employer].   Care instructions adapted under license by your healthcare professional. If you have " questions about a medical condition or this instruction, always ask your healthcare professional. [employer] disclaims any warranty or liability for your use of this information.

## 2023-08-18 NOTE — PROGRESS NOTES
"Patient continues to feel well with the exception of general pregnancy discomforts.  She is starting to feel \"uncomfortable\".  She denies contractions, leakage of fluid or bleeding, RUQ pain, headaches or blurred vision.  She had appropriate questions today about pumping and some baby care.  Reviewed growth ultrasound which looked great and showed EFW 48%ile at 33 weeks.  Measurement is within normal limits today.  Concerns: None  Doing well.  No concerns today.  No vaginal bleeding, LOF.  No contractions.  Cervix check next visit.  Discussed kick counts and fetal movement.  Reportable signs and symptoms discussed.  Discussed PTL, PROM, and when to call or come in.  RTC 2 weeks.    Michelle Moctezuma MD  "

## 2023-09-01 ENCOUNTER — HOSPITAL ENCOUNTER (INPATIENT)
Facility: HOSPITAL | Age: 33
LOS: 4 days | Discharge: HOME | End: 2023-09-05
Attending: FAMILY MEDICINE | Admitting: FAMILY MEDICINE
Payer: COMMERCIAL

## 2023-09-01 ENCOUNTER — HOSPITAL ENCOUNTER (OUTPATIENT)
Facility: HOSPITAL | Age: 33
Discharge: HOME | End: 2023-09-01
Attending: FAMILY MEDICINE | Admitting: FAMILY MEDICINE
Payer: COMMERCIAL

## 2023-09-01 DIAGNOSIS — Z98.891 S/P PRIMARY LOW TRANSVERSE C-SECTION: ICD-10-CM

## 2023-09-01 PROBLEM — Z36.89 ENCOUNTER FOR TRIAGE IN PREGNANT PATIENT: Status: ACTIVE | Noted: 2023-09-01

## 2023-09-01 LAB
ABO/RH(D): NORMAL
ANTIBODY SCREEN: NEGATIVE
CRYSTALS AMN MICRO: ABNORMAL
HGB BLD-MCNC: 11.6 G/DL (ref 11.7–15.7)
SPECIMEN EXPIRATION DATE: NORMAL

## 2023-09-01 PROCEDURE — 86780 TREPONEMA PALLIDUM: CPT | Performed by: FAMILY MEDICINE

## 2023-09-01 PROCEDURE — 87653 STREP B DNA AMP PROBE: CPT | Performed by: FAMILY MEDICINE

## 2023-09-01 PROCEDURE — 250N000013 HC RX MED GY IP 250 OP 250 PS 637: Performed by: FAMILY MEDICINE

## 2023-09-01 PROCEDURE — 120N000001 HC R&B MED SURG/OB

## 2023-09-01 PROCEDURE — 36415 COLL VENOUS BLD VENIPUNCTURE: CPT | Performed by: FAMILY MEDICINE

## 2023-09-01 PROCEDURE — 258N000003 HC RX IP 258 OP 636: Performed by: FAMILY MEDICINE

## 2023-09-01 PROCEDURE — 85018 HEMOGLOBIN: CPT | Performed by: FAMILY MEDICINE

## 2023-09-01 PROCEDURE — 250N000011 HC RX IP 250 OP 636: Mod: JZ | Performed by: FAMILY MEDICINE

## 2023-09-01 PROCEDURE — 99221 1ST HOSP IP/OBS SF/LOW 40: CPT | Performed by: FAMILY MEDICINE

## 2023-09-01 PROCEDURE — 86850 RBC ANTIBODY SCREEN: CPT | Performed by: FAMILY MEDICINE

## 2023-09-01 RX ORDER — MORPHINE SULFATE 10 MG/ML
10 INJECTION, SOLUTION INTRAMUSCULAR; INTRAVENOUS
Status: COMPLETED | OUTPATIENT
Start: 2023-09-01 | End: 2023-09-02

## 2023-09-01 RX ORDER — HYDROXYZINE HYDROCHLORIDE 50 MG/1
100 TABLET, FILM COATED ORAL
Status: COMPLETED | OUTPATIENT
Start: 2023-09-01 | End: 2023-09-02

## 2023-09-01 RX ORDER — METOCLOPRAMIDE 10 MG/1
10 TABLET ORAL EVERY 6 HOURS PRN
Status: DISCONTINUED | OUTPATIENT
Start: 2023-09-01 | End: 2023-09-02 | Stop reason: HOSPADM

## 2023-09-01 RX ORDER — OXYTOCIN/0.9 % SODIUM CHLORIDE 30/500 ML
340 PLASTIC BAG, INJECTION (ML) INTRAVENOUS CONTINUOUS PRN
Status: DISCONTINUED | OUTPATIENT
Start: 2023-09-01 | End: 2023-09-02 | Stop reason: HOSPADM

## 2023-09-01 RX ORDER — FAMOTIDINE 10 MG
10 TABLET ORAL 2 TIMES DAILY PRN
Status: DISCONTINUED | OUTPATIENT
Start: 2023-09-01 | End: 2023-09-05 | Stop reason: HOSPADM

## 2023-09-01 RX ORDER — METOCLOPRAMIDE HYDROCHLORIDE 5 MG/ML
10 INJECTION INTRAMUSCULAR; INTRAVENOUS EVERY 6 HOURS PRN
Status: DISCONTINUED | OUTPATIENT
Start: 2023-09-01 | End: 2023-09-02 | Stop reason: HOSPADM

## 2023-09-01 RX ORDER — KETOROLAC TROMETHAMINE 30 MG/ML
30 INJECTION, SOLUTION INTRAMUSCULAR; INTRAVENOUS
Status: DISCONTINUED | OUTPATIENT
Start: 2023-09-01 | End: 2023-09-05 | Stop reason: HOSPADM

## 2023-09-01 RX ORDER — IBUPROFEN 800 MG/1
800 TABLET, FILM COATED ORAL
Status: DISCONTINUED | OUTPATIENT
Start: 2023-09-01 | End: 2023-09-05 | Stop reason: HOSPADM

## 2023-09-01 RX ORDER — ONDANSETRON 4 MG/1
4 TABLET, ORALLY DISINTEGRATING ORAL EVERY 6 HOURS PRN
Status: DISCONTINUED | OUTPATIENT
Start: 2023-09-01 | End: 2023-09-02 | Stop reason: HOSPADM

## 2023-09-01 RX ORDER — NALOXONE HYDROCHLORIDE 0.4 MG/ML
0.4 INJECTION, SOLUTION INTRAMUSCULAR; INTRAVENOUS; SUBCUTANEOUS
Status: DISCONTINUED | OUTPATIENT
Start: 2023-09-01 | End: 2023-09-02 | Stop reason: HOSPADM

## 2023-09-01 RX ORDER — PENICILLIN G POTASSIUM 5000000 [IU]/1
5 INJECTION, POWDER, FOR SOLUTION INTRAMUSCULAR; INTRAVENOUS ONCE
Status: COMPLETED | OUTPATIENT
Start: 2023-09-01 | End: 2023-09-01

## 2023-09-01 RX ORDER — OXYTOCIN 10 [USP'U]/ML
10 INJECTION, SOLUTION INTRAMUSCULAR; INTRAVENOUS
Status: DISCONTINUED | OUTPATIENT
Start: 2023-09-01 | End: 2023-09-05 | Stop reason: HOSPADM

## 2023-09-01 RX ORDER — NALOXONE HYDROCHLORIDE 0.4 MG/ML
0.2 INJECTION, SOLUTION INTRAMUSCULAR; INTRAVENOUS; SUBCUTANEOUS
Status: DISCONTINUED | OUTPATIENT
Start: 2023-09-01 | End: 2023-09-02 | Stop reason: HOSPADM

## 2023-09-01 RX ORDER — LIDOCAINE 40 MG/G
CREAM TOPICAL
Status: DISCONTINUED | OUTPATIENT
Start: 2023-09-01 | End: 2023-09-01 | Stop reason: HOSPADM

## 2023-09-01 RX ORDER — MISOPROSTOL 100 UG/1
25 TABLET ORAL
Status: DISCONTINUED | OUTPATIENT
Start: 2023-09-01 | End: 2023-09-02 | Stop reason: HOSPADM

## 2023-09-01 RX ORDER — MISOPROSTOL 200 UG/1
800 TABLET ORAL
Status: DISCONTINUED | OUTPATIENT
Start: 2023-09-01 | End: 2023-09-02 | Stop reason: HOSPADM

## 2023-09-01 RX ORDER — PENICILLIN G 3000000 [IU]/50ML
3 INJECTION, SOLUTION INTRAVENOUS EVERY 4 HOURS
Status: DISCONTINUED | OUTPATIENT
Start: 2023-09-01 | End: 2023-09-02 | Stop reason: HOSPADM

## 2023-09-01 RX ORDER — CITRIC ACID/SODIUM CITRATE 334-500MG
30 SOLUTION, ORAL ORAL
Status: DISCONTINUED | OUTPATIENT
Start: 2023-09-01 | End: 2023-09-02 | Stop reason: HOSPADM

## 2023-09-01 RX ORDER — MISOPROSTOL 200 UG/1
400 TABLET ORAL
Status: DISCONTINUED | OUTPATIENT
Start: 2023-09-01 | End: 2023-09-02 | Stop reason: HOSPADM

## 2023-09-01 RX ORDER — METHYLERGONOVINE MALEATE 0.2 MG/ML
200 INJECTION INTRAVENOUS
Status: DISCONTINUED | OUTPATIENT
Start: 2023-09-01 | End: 2023-09-02 | Stop reason: HOSPADM

## 2023-09-01 RX ORDER — CARBOPROST TROMETHAMINE 250 UG/ML
250 INJECTION, SOLUTION INTRAMUSCULAR
Status: DISCONTINUED | OUTPATIENT
Start: 2023-09-01 | End: 2023-09-02 | Stop reason: HOSPADM

## 2023-09-01 RX ORDER — OXYTOCIN/0.9 % SODIUM CHLORIDE 30/500 ML
100-340 PLASTIC BAG, INJECTION (ML) INTRAVENOUS CONTINUOUS PRN
Status: DISCONTINUED | OUTPATIENT
Start: 2023-09-01 | End: 2023-09-05 | Stop reason: HOSPADM

## 2023-09-01 RX ORDER — ONDANSETRON 2 MG/ML
4 INJECTION INTRAMUSCULAR; INTRAVENOUS EVERY 6 HOURS PRN
Status: DISCONTINUED | OUTPATIENT
Start: 2023-09-01 | End: 2023-09-02 | Stop reason: HOSPADM

## 2023-09-01 RX ORDER — PROCHLORPERAZINE 25 MG
25 SUPPOSITORY, RECTAL RECTAL EVERY 12 HOURS PRN
Status: DISCONTINUED | OUTPATIENT
Start: 2023-09-01 | End: 2023-09-02 | Stop reason: HOSPADM

## 2023-09-01 RX ORDER — OXYTOCIN 10 [USP'U]/ML
10 INJECTION, SOLUTION INTRAMUSCULAR; INTRAVENOUS
Status: DISCONTINUED | OUTPATIENT
Start: 2023-09-01 | End: 2023-09-02 | Stop reason: HOSPADM

## 2023-09-01 RX ORDER — PROCHLORPERAZINE MALEATE 10 MG
10 TABLET ORAL EVERY 6 HOURS PRN
Status: DISCONTINUED | OUTPATIENT
Start: 2023-09-01 | End: 2023-09-02 | Stop reason: HOSPADM

## 2023-09-01 RX ORDER — FENTANYL CITRATE 50 UG/ML
50 INJECTION, SOLUTION INTRAMUSCULAR; INTRAVENOUS EVERY 30 MIN PRN
Status: DISCONTINUED | OUTPATIENT
Start: 2023-09-01 | End: 2023-09-02 | Stop reason: HOSPADM

## 2023-09-01 RX ADMIN — FAMOTIDINE 10 MG: 10 TABLET, FILM COATED ORAL at 23:08

## 2023-09-01 RX ADMIN — PENICILLIN G POTASSIUM 5 MILLION UNITS: 5000000 POWDER, FOR SOLUTION INTRAMUSCULAR; INTRAPLEURAL; INTRATHECAL; INTRAVENOUS at 18:52

## 2023-09-01 RX ADMIN — MISOPROSTOL 25 MCG: 100 TABLET ORAL at 21:01

## 2023-09-01 RX ADMIN — SODIUM CHLORIDE, POTASSIUM CHLORIDE, SODIUM LACTATE AND CALCIUM CHLORIDE: 600; 310; 30; 20 INJECTION, SOLUTION INTRAVENOUS at 18:40

## 2023-09-01 RX ADMIN — MISOPROSTOL 25 MCG: 100 TABLET ORAL at 23:03

## 2023-09-01 RX ADMIN — PENICILLIN G 3 MILLION UNITS: 3000000 INJECTION, SOLUTION INTRAVENOUS at 23:06

## 2023-09-01 ASSESSMENT — ACTIVITIES OF DAILY LIVING (ADL)
ADLS_ACUITY_SCORE: 31

## 2023-09-01 NOTE — PROGRESS NOTES
"Patient [place] [facility] [place] out rupture of membranes. Reports feeling a gush of fluid at 4 pm today, states she noted clear fluid and \"goop\" in her underwear, has felt a couple gushes since the first incident.     NST reactive, contractions noted on monitor, patient reports feeling them as [place]. Appears comfortable, able to talk through contractions, abdomen palpates mild and soft in between. Patient denies headache, epigastric pain and visual disturbances, denies vaginal bleeding. VS WDL.Thin clear fluid noted dripping from introitus.     Fern test performed with speculum, white discharge and small clear fluid noted on exam, GBS also collected and sent. Results pending.   "

## 2023-09-02 ENCOUNTER — ANESTHESIA (OUTPATIENT)
Dept: OBGYN | Facility: HOSPITAL | Age: 33
End: 2023-09-02
Payer: COMMERCIAL

## 2023-09-02 ENCOUNTER — ANESTHESIA EVENT (OUTPATIENT)
Dept: OBGYN | Facility: HOSPITAL | Age: 33
End: 2023-09-02
Payer: COMMERCIAL

## 2023-09-02 LAB
GP B STREP DNA SPEC QL NAA+PROBE: NEGATIVE
HGB BLD-MCNC: 11.8 G/DL (ref 11.7–15.7)
HOLD SPECIMEN: NORMAL
T PALLIDUM AB SER QL: NONREACTIVE

## 2023-09-02 PROCEDURE — 370N000017 HC ANESTHESIA TECHNICAL FEE, PER MIN: Performed by: OBSTETRICS & GYNECOLOGY

## 2023-09-02 PROCEDURE — 120N000001 HC R&B MED SURG/OB

## 2023-09-02 PROCEDURE — 258N000003 HC RX IP 258 OP 636: Performed by: ANESTHESIOLOGY

## 2023-09-02 PROCEDURE — 999N000249 HC STATISTIC C-SECTION ON UNIT

## 2023-09-02 PROCEDURE — 250N000011 HC RX IP 250 OP 636: Performed by: ANESTHESIOLOGY

## 2023-09-02 PROCEDURE — 250N000009 HC RX 250: Performed by: ANESTHESIOLOGY

## 2023-09-02 PROCEDURE — 360N000076 HC SURGERY LEVEL 3, PER MIN: Performed by: OBSTETRICS & GYNECOLOGY

## 2023-09-02 PROCEDURE — 00HU33Z INSERTION OF INFUSION DEVICE INTO SPINAL CANAL, PERCUTANEOUS APPROACH: ICD-10-PCS | Performed by: ANESTHESIOLOGY

## 2023-09-02 PROCEDURE — 250N000009 HC RX 250: Performed by: FAMILY MEDICINE

## 2023-09-02 PROCEDURE — 258N000003 HC RX IP 258 OP 636: Performed by: OBSTETRICS & GYNECOLOGY

## 2023-09-02 PROCEDURE — 250N000011 HC RX IP 250 OP 636: Mod: JZ | Performed by: FAMILY MEDICINE

## 2023-09-02 PROCEDURE — 272N000001 HC OR GENERAL SUPPLY STERILE: Performed by: OBSTETRICS & GYNECOLOGY

## 2023-09-02 PROCEDURE — 250N000011 HC RX IP 250 OP 636: Performed by: OBSTETRICS & GYNECOLOGY

## 2023-09-02 PROCEDURE — 36415 COLL VENOUS BLD VENIPUNCTURE: CPT | Performed by: OBSTETRICS & GYNECOLOGY

## 2023-09-02 PROCEDURE — 250N000013 HC RX MED GY IP 250 OP 250 PS 637: Performed by: FAMILY MEDICINE

## 2023-09-02 PROCEDURE — 99233 SBSQ HOSP IP/OBS HIGH 50: CPT | Performed by: FAMILY MEDICINE

## 2023-09-02 PROCEDURE — 250N000013 HC RX MED GY IP 250 OP 250 PS 637: Performed by: OBSTETRICS & GYNECOLOGY

## 2023-09-02 PROCEDURE — 85018 HEMOGLOBIN: CPT | Performed by: OBSTETRICS & GYNECOLOGY

## 2023-09-02 PROCEDURE — 3E0R3BZ INTRODUCTION OF ANESTHETIC AGENT INTO SPINAL CANAL, PERCUTANEOUS APPROACH: ICD-10-PCS | Performed by: ANESTHESIOLOGY

## 2023-09-02 RX ORDER — HYDROMORPHONE HCL IN WATER/PF 6 MG/30 ML
0.2 PATIENT CONTROLLED ANALGESIA SYRINGE INTRAVENOUS EVERY 5 MIN PRN
Status: DISCONTINUED | OUTPATIENT
Start: 2023-09-02 | End: 2023-09-05 | Stop reason: HOSPADM

## 2023-09-02 RX ORDER — ONDANSETRON 2 MG/ML
4 INJECTION INTRAMUSCULAR; INTRAVENOUS EVERY 6 HOURS PRN
Status: DISCONTINUED | OUTPATIENT
Start: 2023-09-02 | End: 2023-09-05 | Stop reason: HOSPADM

## 2023-09-02 RX ORDER — OXYTOCIN 10 [USP'U]/ML
10 INJECTION, SOLUTION INTRAMUSCULAR; INTRAVENOUS
Status: DISCONTINUED | OUTPATIENT
Start: 2023-09-02 | End: 2023-09-02 | Stop reason: HOSPADM

## 2023-09-02 RX ORDER — EPHEDRINE SULFATE 50 MG/ML
5 INJECTION, SOLUTION INTRAMUSCULAR; INTRAVENOUS; SUBCUTANEOUS
Status: DISCONTINUED | OUTPATIENT
Start: 2023-09-02 | End: 2023-09-02 | Stop reason: HOSPADM

## 2023-09-02 RX ORDER — SODIUM CHLORIDE, SODIUM LACTATE, POTASSIUM CHLORIDE, CALCIUM CHLORIDE 600; 310; 30; 20 MG/100ML; MG/100ML; MG/100ML; MG/100ML
INJECTION, SOLUTION INTRAVENOUS CONTINUOUS
Status: DISCONTINUED | OUTPATIENT
Start: 2023-09-02 | End: 2023-09-02 | Stop reason: HOSPADM

## 2023-09-02 RX ORDER — TERBUTALINE SULFATE 1 MG/ML
0.25 INJECTION, SOLUTION SUBCUTANEOUS
Status: DISCONTINUED | OUTPATIENT
Start: 2023-09-02 | End: 2023-09-02

## 2023-09-02 RX ORDER — OXYCODONE HYDROCHLORIDE 5 MG/1
5 TABLET ORAL EVERY 4 HOURS PRN
Status: DISCONTINUED | OUTPATIENT
Start: 2023-09-02 | End: 2023-09-05 | Stop reason: HOSPADM

## 2023-09-02 RX ORDER — CARBOPROST TROMETHAMINE 250 UG/ML
250 INJECTION, SOLUTION INTRAMUSCULAR
Status: DISCONTINUED | OUTPATIENT
Start: 2023-09-02 | End: 2023-09-02 | Stop reason: HOSPADM

## 2023-09-02 RX ORDER — SIMETHICONE 80 MG
80 TABLET,CHEWABLE ORAL 4 TIMES DAILY PRN
Status: DISCONTINUED | OUTPATIENT
Start: 2023-09-02 | End: 2023-09-05 | Stop reason: HOSPADM

## 2023-09-02 RX ORDER — MISOPROSTOL 200 UG/1
400 TABLET ORAL
Status: DISCONTINUED | OUTPATIENT
Start: 2023-09-02 | End: 2023-09-05 | Stop reason: HOSPADM

## 2023-09-02 RX ORDER — FENTANYL CITRATE 50 UG/ML
25 INJECTION, SOLUTION INTRAMUSCULAR; INTRAVENOUS EVERY 5 MIN PRN
Status: DISCONTINUED | OUTPATIENT
Start: 2023-09-02 | End: 2023-09-05 | Stop reason: HOSPADM

## 2023-09-02 RX ORDER — OXYTOCIN 10 [USP'U]/ML
10 INJECTION, SOLUTION INTRAMUSCULAR; INTRAVENOUS
Status: DISCONTINUED | OUTPATIENT
Start: 2023-09-02 | End: 2023-09-05 | Stop reason: HOSPADM

## 2023-09-02 RX ORDER — ONDANSETRON 2 MG/ML
4 INJECTION INTRAMUSCULAR; INTRAVENOUS EVERY 30 MIN PRN
Status: DISCONTINUED | OUTPATIENT
Start: 2023-09-02 | End: 2023-09-05 | Stop reason: HOSPADM

## 2023-09-02 RX ORDER — LIDOCAINE 40 MG/G
CREAM TOPICAL
Status: DISCONTINUED | OUTPATIENT
Start: 2023-09-02 | End: 2023-09-02

## 2023-09-02 RX ORDER — ACETAMINOPHEN 325 MG/1
975 TABLET ORAL EVERY 6 HOURS
Status: DISCONTINUED | OUTPATIENT
Start: 2023-09-03 | End: 2023-09-05 | Stop reason: HOSPADM

## 2023-09-02 RX ORDER — AMOXICILLIN 250 MG
2 CAPSULE ORAL 2 TIMES DAILY
Status: DISCONTINUED | OUTPATIENT
Start: 2023-09-02 | End: 2023-09-05 | Stop reason: HOSPADM

## 2023-09-02 RX ORDER — ACETAMINOPHEN 325 MG/1
975 TABLET ORAL ONCE
Status: DISCONTINUED | OUTPATIENT
Start: 2023-09-02 | End: 2023-09-02

## 2023-09-02 RX ORDER — MORPHINE SULFATE 1 MG/ML
INJECTION, SOLUTION EPIDURAL; INTRATHECAL; INTRAVENOUS PRN
Status: DISCONTINUED | OUTPATIENT
Start: 2023-09-02 | End: 2023-09-02

## 2023-09-02 RX ORDER — LIDOCAINE HCL/EPINEPHRINE/PF 2%-1:200K
VIAL (ML) INJECTION
Status: COMPLETED | OUTPATIENT
Start: 2023-09-02 | End: 2023-09-02

## 2023-09-02 RX ORDER — IBUPROFEN 800 MG/1
800 TABLET, FILM COATED ORAL EVERY 6 HOURS
Status: DISCONTINUED | OUTPATIENT
Start: 2023-09-03 | End: 2023-09-05 | Stop reason: HOSPADM

## 2023-09-02 RX ORDER — LIDOCAINE HYDROCHLORIDE AND EPINEPHRINE BITARTRATE 20; .01 MG/ML; MG/ML
INJECTION, SOLUTION SUBCUTANEOUS PRN
Status: DISCONTINUED | OUTPATIENT
Start: 2023-09-02 | End: 2023-09-02

## 2023-09-02 RX ORDER — ACETAMINOPHEN 325 MG/1
975 TABLET ORAL ONCE
Status: COMPLETED | OUTPATIENT
Start: 2023-09-02 | End: 2023-09-02

## 2023-09-02 RX ORDER — NALOXONE HYDROCHLORIDE 0.4 MG/ML
0.2 INJECTION, SOLUTION INTRAMUSCULAR; INTRAVENOUS; SUBCUTANEOUS
Status: DISCONTINUED | OUTPATIENT
Start: 2023-09-02 | End: 2023-09-05 | Stop reason: HOSPADM

## 2023-09-02 RX ORDER — LIDOCAINE 40 MG/G
CREAM TOPICAL
Status: DISCONTINUED | OUTPATIENT
Start: 2023-09-02 | End: 2023-09-05 | Stop reason: HOSPADM

## 2023-09-02 RX ORDER — MORPHINE SULFATE 0.5 MG/ML
2 INJECTION, SOLUTION EPIDURAL; INTRATHECAL; INTRAVENOUS ONCE
Status: DISCONTINUED | OUTPATIENT
Start: 2023-09-02 | End: 2023-09-05 | Stop reason: HOSPADM

## 2023-09-02 RX ORDER — OXYTOCIN/0.9 % SODIUM CHLORIDE 30/500 ML
1-24 PLASTIC BAG, INJECTION (ML) INTRAVENOUS CONTINUOUS
Status: DISCONTINUED | OUTPATIENT
Start: 2023-09-02 | End: 2023-09-02

## 2023-09-02 RX ORDER — MISOPROSTOL 200 UG/1
400 TABLET ORAL
Status: DISCONTINUED | OUTPATIENT
Start: 2023-09-02 | End: 2023-09-02 | Stop reason: HOSPADM

## 2023-09-02 RX ORDER — MODIFIED LANOLIN
OINTMENT (GRAM) TOPICAL
Status: DISCONTINUED | OUTPATIENT
Start: 2023-09-02 | End: 2023-09-05 | Stop reason: HOSPADM

## 2023-09-02 RX ORDER — ONDANSETRON 4 MG/1
4 TABLET, ORALLY DISINTEGRATING ORAL EVERY 6 HOURS PRN
Status: DISCONTINUED | OUTPATIENT
Start: 2023-09-02 | End: 2023-09-05 | Stop reason: HOSPADM

## 2023-09-02 RX ORDER — OXYTOCIN/0.9 % SODIUM CHLORIDE 30/500 ML
100-340 PLASTIC BAG, INJECTION (ML) INTRAVENOUS CONTINUOUS PRN
Status: DISCONTINUED | OUTPATIENT
Start: 2023-09-02 | End: 2023-09-05 | Stop reason: HOSPADM

## 2023-09-02 RX ORDER — FENTANYL/ROPIVACAINE/NS/PF 2MCG/ML-.1
PLASTIC BAG, INJECTION (ML) EPIDURAL
Status: DISCONTINUED | OUTPATIENT
Start: 2023-09-02 | End: 2023-09-02 | Stop reason: HOSPADM

## 2023-09-02 RX ORDER — SODIUM CHLORIDE, SODIUM LACTATE, POTASSIUM CHLORIDE, CALCIUM CHLORIDE 600; 310; 30; 20 MG/100ML; MG/100ML; MG/100ML; MG/100ML
INJECTION, SOLUTION INTRAVENOUS CONTINUOUS
Status: DISCONTINUED | OUTPATIENT
Start: 2023-09-02 | End: 2023-09-05 | Stop reason: HOSPADM

## 2023-09-02 RX ORDER — PROCHLORPERAZINE 25 MG
25 SUPPOSITORY, RECTAL RECTAL EVERY 12 HOURS PRN
Status: DISCONTINUED | OUTPATIENT
Start: 2023-09-02 | End: 2023-09-05 | Stop reason: HOSPADM

## 2023-09-02 RX ORDER — METHYLERGONOVINE MALEATE 0.2 MG/ML
200 INJECTION INTRAVENOUS
Status: DISCONTINUED | OUTPATIENT
Start: 2023-09-02 | End: 2023-09-02 | Stop reason: HOSPADM

## 2023-09-02 RX ORDER — OXYTOCIN/0.9 % SODIUM CHLORIDE 30/500 ML
340 PLASTIC BAG, INJECTION (ML) INTRAVENOUS CONTINUOUS PRN
Status: DISCONTINUED | OUTPATIENT
Start: 2023-09-02 | End: 2023-09-02 | Stop reason: HOSPADM

## 2023-09-02 RX ORDER — MISOPROSTOL 200 UG/1
800 TABLET ORAL
Status: DISCONTINUED | OUTPATIENT
Start: 2023-09-02 | End: 2023-09-02 | Stop reason: HOSPADM

## 2023-09-02 RX ORDER — ONDANSETRON 4 MG/1
4 TABLET, ORALLY DISINTEGRATING ORAL EVERY 6 HOURS PRN
Status: DISCONTINUED | OUTPATIENT
Start: 2023-09-02 | End: 2023-09-02 | Stop reason: HOSPADM

## 2023-09-02 RX ORDER — CEFAZOLIN SODIUM/WATER 2 G/20 ML
2 SYRINGE (ML) INTRAVENOUS SEE ADMIN INSTRUCTIONS
Status: DISCONTINUED | OUTPATIENT
Start: 2023-09-02 | End: 2023-09-02 | Stop reason: HOSPADM

## 2023-09-02 RX ORDER — OXYTOCIN/0.9 % SODIUM CHLORIDE 30/500 ML
340 PLASTIC BAG, INJECTION (ML) INTRAVENOUS CONTINUOUS PRN
Status: DISCONTINUED | OUTPATIENT
Start: 2023-09-02 | End: 2023-09-05 | Stop reason: HOSPADM

## 2023-09-02 RX ORDER — NALOXONE HYDROCHLORIDE 0.4 MG/ML
0.4 INJECTION, SOLUTION INTRAMUSCULAR; INTRAVENOUS; SUBCUTANEOUS
Status: DISCONTINUED | OUTPATIENT
Start: 2023-09-02 | End: 2023-09-05 | Stop reason: HOSPADM

## 2023-09-02 RX ORDER — CEFAZOLIN SODIUM/WATER 2 G/20 ML
2 SYRINGE (ML) INTRAVENOUS
Status: COMPLETED | OUTPATIENT
Start: 2023-09-02 | End: 2023-09-02

## 2023-09-02 RX ORDER — CITRIC ACID/SODIUM CITRATE 334-500MG
30 SOLUTION, ORAL ORAL
Status: COMPLETED | OUTPATIENT
Start: 2023-09-02 | End: 2023-09-02

## 2023-09-02 RX ORDER — PROCHLORPERAZINE MALEATE 10 MG
10 TABLET ORAL EVERY 6 HOURS PRN
Status: DISCONTINUED | OUTPATIENT
Start: 2023-09-02 | End: 2023-09-05 | Stop reason: HOSPADM

## 2023-09-02 RX ORDER — ONDANSETRON 2 MG/ML
4 INJECTION INTRAMUSCULAR; INTRAVENOUS EVERY 6 HOURS PRN
Status: DISCONTINUED | OUTPATIENT
Start: 2023-09-02 | End: 2023-09-02 | Stop reason: HOSPADM

## 2023-09-02 RX ORDER — POLYETHYLENE GLYCOL 3350 17 G/17G
17 POWDER, FOR SOLUTION ORAL DAILY
Status: DISCONTINUED | OUTPATIENT
Start: 2023-09-02 | End: 2023-09-02

## 2023-09-02 RX ORDER — ONDANSETRON 4 MG/1
4 TABLET, ORALLY DISINTEGRATING ORAL EVERY 30 MIN PRN
Status: DISCONTINUED | OUTPATIENT
Start: 2023-09-02 | End: 2023-09-05 | Stop reason: HOSPADM

## 2023-09-02 RX ORDER — FENTANYL CITRATE 50 UG/ML
INJECTION, SOLUTION INTRAMUSCULAR; INTRAVENOUS
Status: COMPLETED | OUTPATIENT
Start: 2023-09-02 | End: 2023-09-02

## 2023-09-02 RX ORDER — CARBOPROST TROMETHAMINE 250 UG/ML
250 INJECTION, SOLUTION INTRAMUSCULAR
Status: DISCONTINUED | OUTPATIENT
Start: 2023-09-02 | End: 2023-09-05 | Stop reason: HOSPADM

## 2023-09-02 RX ORDER — MISOPROSTOL 200 UG/1
800 TABLET ORAL
Status: DISCONTINUED | OUTPATIENT
Start: 2023-09-02 | End: 2023-09-05 | Stop reason: HOSPADM

## 2023-09-02 RX ORDER — HYDROMORPHONE HCL IN WATER/PF 6 MG/30 ML
0.4 PATIENT CONTROLLED ANALGESIA SYRINGE INTRAVENOUS EVERY 5 MIN PRN
Status: DISCONTINUED | OUTPATIENT
Start: 2023-09-02 | End: 2023-09-05 | Stop reason: HOSPADM

## 2023-09-02 RX ORDER — FENTANYL CITRATE 50 UG/ML
100 INJECTION, SOLUTION INTRAMUSCULAR; INTRAVENOUS ONCE
Status: DISCONTINUED | OUTPATIENT
Start: 2023-09-02 | End: 2023-09-02 | Stop reason: HOSPADM

## 2023-09-02 RX ORDER — KETOROLAC TROMETHAMINE 30 MG/ML
30 INJECTION, SOLUTION INTRAMUSCULAR; INTRAVENOUS EVERY 6 HOURS
Status: COMPLETED | OUTPATIENT
Start: 2023-09-03 | End: 2023-09-03

## 2023-09-02 RX ORDER — LIDOCAINE 40 MG/G
CREAM TOPICAL
Status: DISCONTINUED | OUTPATIENT
Start: 2023-09-02 | End: 2023-09-02 | Stop reason: HOSPADM

## 2023-09-02 RX ORDER — SODIUM CHLORIDE, SODIUM LACTATE, POTASSIUM CHLORIDE, CALCIUM CHLORIDE 600; 310; 30; 20 MG/100ML; MG/100ML; MG/100ML; MG/100ML
INJECTION, SOLUTION INTRAVENOUS CONTINUOUS PRN
Status: DISCONTINUED | OUTPATIENT
Start: 2023-09-02 | End: 2023-09-02

## 2023-09-02 RX ORDER — METHYLERGONOVINE MALEATE 0.2 MG/ML
200 INJECTION INTRAVENOUS
Status: DISCONTINUED | OUTPATIENT
Start: 2023-09-02 | End: 2023-09-05 | Stop reason: HOSPADM

## 2023-09-02 RX ORDER — POLYETHYLENE GLYCOL 3350 17 G/17G
17 POWDER, FOR SOLUTION ORAL DAILY PRN
Status: DISCONTINUED | OUTPATIENT
Start: 2023-09-02 | End: 2023-09-05 | Stop reason: HOSPADM

## 2023-09-02 RX ORDER — FENTANYL CITRATE 50 UG/ML
50 INJECTION, SOLUTION INTRAMUSCULAR; INTRAVENOUS EVERY 5 MIN PRN
Status: DISCONTINUED | OUTPATIENT
Start: 2023-09-02 | End: 2023-09-05 | Stop reason: HOSPADM

## 2023-09-02 RX ORDER — HYDROCORTISONE 25 MG/G
CREAM TOPICAL 3 TIMES DAILY PRN
Status: DISCONTINUED | OUTPATIENT
Start: 2023-09-02 | End: 2023-09-05 | Stop reason: HOSPADM

## 2023-09-02 RX ORDER — AMOXICILLIN 250 MG
1 CAPSULE ORAL 2 TIMES DAILY
Status: DISCONTINUED | OUTPATIENT
Start: 2023-09-02 | End: 2023-09-05 | Stop reason: HOSPADM

## 2023-09-02 RX ORDER — METOCLOPRAMIDE HYDROCHLORIDE 5 MG/ML
10 INJECTION INTRAMUSCULAR; INTRAVENOUS EVERY 6 HOURS PRN
Status: DISCONTINUED | OUTPATIENT
Start: 2023-09-02 | End: 2023-09-05 | Stop reason: HOSPADM

## 2023-09-02 RX ORDER — NALBUPHINE HYDROCHLORIDE 20 MG/ML
2.5-5 INJECTION, SOLUTION INTRAMUSCULAR; INTRAVENOUS; SUBCUTANEOUS EVERY 6 HOURS PRN
Status: DISCONTINUED | OUTPATIENT
Start: 2023-09-02 | End: 2023-09-05 | Stop reason: HOSPADM

## 2023-09-02 RX ORDER — DEXTROSE, SODIUM CHLORIDE, SODIUM LACTATE, POTASSIUM CHLORIDE, AND CALCIUM CHLORIDE 5; .6; .31; .03; .02 G/100ML; G/100ML; G/100ML; G/100ML; G/100ML
INJECTION, SOLUTION INTRAVENOUS CONTINUOUS
Status: DISCONTINUED | OUTPATIENT
Start: 2023-09-02 | End: 2023-09-05 | Stop reason: HOSPADM

## 2023-09-02 RX ORDER — METOCLOPRAMIDE 10 MG/1
10 TABLET ORAL EVERY 6 HOURS PRN
Status: DISCONTINUED | OUTPATIENT
Start: 2023-09-02 | End: 2023-09-05 | Stop reason: HOSPADM

## 2023-09-02 RX ORDER — BISACODYL 10 MG
10 SUPPOSITORY, RECTAL RECTAL DAILY PRN
Status: DISCONTINUED | OUTPATIENT
Start: 2023-09-04 | End: 2023-09-05 | Stop reason: HOSPADM

## 2023-09-02 RX ADMIN — SODIUM CHLORIDE, POTASSIUM CHLORIDE, SODIUM LACTATE AND CALCIUM CHLORIDE: 600; 310; 30; 20 INJECTION, SOLUTION INTRAVENOUS at 19:42

## 2023-09-02 RX ADMIN — PENICILLIN G 3 MILLION UNITS: 3000000 INJECTION, SOLUTION INTRAVENOUS at 03:03

## 2023-09-02 RX ADMIN — SODIUM CHLORIDE, POTASSIUM CHLORIDE, SODIUM LACTATE AND CALCIUM CHLORIDE: 600; 310; 30; 20 INJECTION, SOLUTION INTRAVENOUS at 20:29

## 2023-09-02 RX ADMIN — Medication 0.5 MG: at 20:04

## 2023-09-02 RX ADMIN — PENICILLIN G 3 MILLION UNITS: 3000000 INJECTION, SOLUTION INTRAVENOUS at 15:31

## 2023-09-02 RX ADMIN — PENICILLIN G 3 MILLION UNITS: 3000000 INJECTION, SOLUTION INTRAVENOUS at 07:08

## 2023-09-02 RX ADMIN — LIDOCAINE HYDROCHLORIDE AND EPINEPHRINE 15 ML: 20; 10 INJECTION, SOLUTION INFILTRATION; PERINEURAL at 19:46

## 2023-09-02 RX ADMIN — SODIUM CITRATE AND CITRIC ACID MONOHYDRATE 30 ML: 500; 334 SOLUTION ORAL at 19:34

## 2023-09-02 RX ADMIN — FENTANYL CITRATE 50 MCG: 50 INJECTION, SOLUTION INTRAMUSCULAR; INTRAVENOUS at 11:41

## 2023-09-02 RX ADMIN — HYDROXYZINE HYDROCHLORIDE 100 MG: 50 TABLET, FILM COATED ORAL at 04:00

## 2023-09-02 RX ADMIN — Medication 2 G: at 19:45

## 2023-09-02 RX ADMIN — AZITHROMYCIN MONOHYDRATE 500 MG: 500 INJECTION, POWDER, LYOPHILIZED, FOR SOLUTION INTRAVENOUS at 20:06

## 2023-09-02 RX ADMIN — MISOPROSTOL 25 MCG: 100 TABLET ORAL at 01:06

## 2023-09-02 RX ADMIN — Medication: at 13:27

## 2023-09-02 RX ADMIN — KETOROLAC TROMETHAMINE 30 MG: 30 INJECTION, SOLUTION INTRAMUSCULAR at 22:47

## 2023-09-02 RX ADMIN — ACETAMINOPHEN 975 MG: 325 TABLET ORAL at 19:34

## 2023-09-02 RX ADMIN — Medication 2 MILLI-UNITS/MIN: at 09:38

## 2023-09-02 RX ADMIN — Medication 0.5 MG: at 20:09

## 2023-09-02 RX ADMIN — Medication 300 ML/HR: at 19:57

## 2023-09-02 RX ADMIN — MISOPROSTOL 25 MCG: 100 TABLET ORAL at 03:07

## 2023-09-02 RX ADMIN — PENICILLIN G 3 MILLION UNITS: 3000000 INJECTION, SOLUTION INTRAVENOUS at 11:26

## 2023-09-02 RX ADMIN — Medication 0.5 MG: at 20:14

## 2023-09-02 RX ADMIN — METOCLOPRAMIDE 10 MG: 5 INJECTION, SOLUTION INTRAMUSCULAR; INTRAVENOUS at 20:54

## 2023-09-02 RX ADMIN — FENTANYL CITRATE 100 MCG: 50 INJECTION, SOLUTION INTRAMUSCULAR; INTRAVENOUS at 13:40

## 2023-09-02 RX ADMIN — MORPHINE SULFATE 10 MG: 10 INJECTION INTRAVENOUS at 05:26

## 2023-09-02 RX ADMIN — LIDOCAINE HYDROCHLORIDE,EPINEPHRINE BITARTRATE 4 ML: 20; .005 INJECTION, SOLUTION EPIDURAL; INFILTRATION; INTRACAUDAL; PERINEURAL at 13:40

## 2023-09-02 RX ADMIN — Medication 0.5 MG: at 20:19

## 2023-09-02 ASSESSMENT — ACTIVITIES OF DAILY LIVING (ADL)
ADLS_ACUITY_SCORE: 18
FALL_HISTORY_WITHIN_LAST_SIX_MONTHS: NO
ADLS_ACUITY_SCORE: 18
WALKING_OR_CLIMBING_STAIRS_DIFFICULTY: NO
ADLS_ACUITY_SCORE: 18
CHANGE_IN_FUNCTIONAL_STATUS_SINCE_ONSET_OF_CURRENT_ILLNESS/INJURY: NO
ADLS_ACUITY_SCORE: 18
DOING_ERRANDS_INDEPENDENTLY_DIFFICULTY: NO
ADLS_ACUITY_SCORE: 18
DRESSING/BATHING_DIFFICULTY: NO
ADLS_ACUITY_SCORE: 18
DIFFICULTY_EATING/SWALLOWING: NO
ADLS_ACUITY_SCORE: 31
ADLS_ACUITY_SCORE: 18
WEAR_GLASSES_OR_BLIND: NO
ADLS_ACUITY_SCORE: 18
DIFFICULTY_COMMUNICATING: NO
TOILETING_ISSUES: NO
CONCENTRATING,_REMEMBERING_OR_MAKING_DECISIONS_DIFFICULTY: NO
ADLS_ACUITY_SCORE: 18

## 2023-09-02 NOTE — PROGRESS NOTES
Received call from RN around 0730 this AM that patient is bobby every 2-3 minutes and they palpate moderate.  Patient is able to talk through them, winces slightly during contractions.  SVE unchanged, last dose of Cytotec around 0300.  Plan will be to have patient shower and eat breakfast and then proceed with pitocin.    FRANK Moctezuma MD

## 2023-09-02 NOTE — PROGRESS NOTES
Alonson test positive, provider notified. GBS prophylaxis started, plan to defer exam until 2000 to allow time for spontaneous labor.     Patient in agreement with plan of care, able to sit up and eat, talking with contractions and on her phone, appears to remain comfortable.    Patient afebrile, amniotic fluid remains clear.

## 2023-09-02 NOTE — PROGRESS NOTES
OB Labor Progress:    Patient comfortable with epidural after a difficult placement per patient and RN.  Earlier was having some recurrent variable decelerations with contractions but cat I with accels between.  These seemed to have resolved with position changes.    On SVE, 7/90%/0 with palpable caput.  Exact position difficult to determine.  Had a 3 minute deceleration when on back for SVE and straight catheterization, resolved with position change to side-lying with peanut ball.    Pitocin currently at 4 mU, max for the day, likely majority of labor is spontaneous.      Plan continue titration of pitocin PRN, position changes to facilitate rotation.  Still anticipate vaginal delivery.    FRANK Moctezuma MD

## 2023-09-02 NOTE — PROGRESS NOTES
OB Labor Progress:    Patient remains comfortable with epidural.  Had a second prolonged (3 minute) deceleration.  This resolved with position change to hands and knees.  Remains 7-8 cm/90%/0 with caput to +1 station.  Scalp electrode and IUPC placed.     During writing of this note, patient had a 3rd prolonged deceleration, this one lasting 2 minutes after positioned on her right side.  Repositioned to her left side with resolution.  Currently during this writing, moderate variability without decelerations with most recent contraction.    IHOB made aware of patient, not yet consulted for operative delivery.  Contractions not adequate at this time, around 150 mvus.    Total time spent at bedside so far 120 minutes.    FRANK Moctezuma MD

## 2023-09-02 NOTE — PROGRESS NOTES
Provider notification:  Provider Name: Dr. Jose Elias DIEZ. Notified at 1510 regarding a persistent category II fetal heart rate tracing.    Baseline rate 135  , normal  Variability minimal  Accelerations not present  Decelerations present, deceleration type: early, prolonged, variable. deceleration frequency: recurrent (occurring 50% or more).   Are the decelerations significant?   No   variable, deceleration frequency: recurrent (occurring 50% or more).    prolonged, deceleration frequency: recurrent (occurring 50% or more).   Are the decelerations significant?   Yes      EFM interpretation suggests concern for fetal metabolic acidemia at this time due to decelerations present variable, deceleration frequency: recurrent (occurring 50% or more).   Are the decelerations significant?   No   prolonged, deceleration frequency: recurrent (occurring 50% or more).   Are the decelerations significant?   Yes  , heart rate: normal baseline, and variability: minimal    Uterine Activity normal/regular.    Interventions to improve fetal oxygenation for a category II tracing include:maternal positioning, IV fluid bolus , discontinue oxytocin , increase frequency of assessment, consult Category 2 algorithm, evaluate labor progress, sterile vaginal exam, and emotional support    After discussion with provider:Provider coming to bedside

## 2023-09-02 NOTE — PLAN OF CARE
Problem: Plan of Care - These are the overarching goals to be used throughout the patient stay.    Goal: Plan of Care Review  Description: The Plan of Care Review/Shift note should be completed every shift.  The Outcome Evaluation is a brief statement about your assessment that the patient is improving, declining, or no change.  This information will be displayed automatically on your shift note.  Outcome: Progressing     Problem: Plan of Care - These are the overarching goals to be used throughout the patient stay.    Goal: Optimal Comfort and Wellbeing  Outcome: Progressing  Intervention: Provide Person-Centered Care  Recent Flowsheet Documentation  Taken 2023 by Kassandra Zamudio RN  Trust Relationship/Rapport:   care explained   choices provided   emotional support provided   empathic listening provided   questions answered   questions encouraged   reassurance provided   thoughts/feelings acknowledged     Problem: Labor  Goal: Stable Fetal Wellbeing  Outcome: Progressing  Intervention: Promote and Monitor Fetal Wellbeing  Recent Flowsheet Documentation  Taken 2023 by Kassandra Zamudio RN  Body Position: position changed independently     Problem: Labor  Goal: Absence of Infection Signs and Symptoms  Outcome: Progressing     Problem: Labor  Goal: Acceptable Pain Control  Outcome: Progressing     Problem: Labor  Goal: Normal Uterine Contraction Pattern  Outcome: Progressing  Pt is a 34 yo  with PPROM, clear fluid. GBS pending, is being Tx with IV PCN. VSS, , category 1 tracing. Pt was given 4 doses of Cytotec and ctx have become stronger and more regular. States she has bloody show when using the toilet. Pt was given therapeutic sleep with adequate relief.  is present and supportive. Oriented to room and POC.

## 2023-09-02 NOTE — ANESTHESIA PROCEDURE NOTES
"Epidural catheter Procedure Note    Pre-Procedure   Staff -        Anesthesiologist:  Manny Menjivar MD       Performed By: anesthesiologist       Location: OB       Procedure Start/Stop Times: 9/2/2023 1:22 PM and 9/2/2023 1:43 PM       Pre-Anesthestic Checklist: patient identified, IV checked, risks and benefits discussed, informed consent, monitors and equipment checked, pre-op evaluation, at physician/surgeon's request and post-op pain management  Timeout:       Correct Patient: Yes        Correct Procedure: Yes        Correct Site: Yes        Correct Position: Yes   Procedure Documentation  Procedure: epidural catheter       Patient Position: sitting       Skin prep: Chloraprep       Local skin infiltrated with 3 mL of 1% lidocaine.        Insertion Site: L2-3. (midline approach).       Technique: LORT saline        Needle Type: [employer]       Needle Gauge: 18.        Needle Length (Inches): 5        Catheter: 20 G.          Catheter threaded easily.             # of attempts: 3 (multipplr attempts and redirects at L1-2.  One pass at L2-3) and  # of redirects:  3    Assessment/Narrative         Paresthesias: No.       Test dose of 4 mL lidocaine 1.5% w/ 1:200,000 epinephrine at 13:36 CDT.         Test dose negative, 3 minutes after injection, for signs of intravascular, subdural, or intrathecal injection.       Insertion/Infusion Method: LORT saline       Aspiration negative for Heme or CSF via Epidural Catheter.    Medication(s) Administered   2% Lidocaine w/ 1:200K Epi (EPIDURAL) - EPIDURAL   4 mL - 9/2/2023 1:40:00 PM  Fentanyl PF (Epidural) - EPIDURAL   100 mcg - 9/2/2023 1:40:00 PM  Medication Administration Time: 9/2/2023 1:22 PM      FOR [facility] ([facility]) ONLY:   Pain Team Contact information: please page the Pain Team Via Player[employer]. Search \"Pain\". During daytime hours, please page the attending first. At night please page the resident first.      "

## 2023-09-02 NOTE — ANESTHESIA PREPROCEDURE EVALUATION
Anesthesia Pre-Procedure Evaluation    Patient: Rubi Mehta   MRN: [id number] : 1990        Procedure :           Past Medical History:   Diagnosis Date    Hyperthyroidism     Hyperthyroidism     in [place] treated with PTU for 4 years      Past Surgical History:   Procedure Laterality Date    WISDOM TOOTH EXTRACTION        Allergies   Allergen Reactions    Propylthiouracil Hives      Social History     Tobacco Use    Smoking status: Never    Smokeless tobacco: Never   Substance Use Topics    Alcohol use: Not Currently      Wt Readings from Last 1 Encounters:   23 68.5 kg (151 lb)        Anesthesia Evaluation            ROS/MED HX  ENT/Pulmonary:  - neg pulmonary ROS     Neurologic:  - neg neurologic ROS     Cardiovascular:  - neg cardiovascular ROS     METS/Exercise Tolerance: >4 METS    Hematologic:  - neg hematologic  ROS     Musculoskeletal:  - neg musculoskeletal ROS     GI/Hepatic:  - neg GI/hepatic ROS     Renal/Genitourinary:  - neg Renal ROS     Endo:  - neg endo ROS   (+)          thyroid problem,            Psychiatric/Substance Use:  - neg psychiatric ROS     Infectious Disease:  - neg infectious disease ROS     Malignancy:  - neg malignancy ROS     Other:      (+) Possibly pregnant, , ,         Physical Exam    Airway        Mallampati: I   TM distance: > 3 FB   Neck ROM: full   Mouth opening: > 3 cm    Respiratory Devices and Support         Dental  no notable dental history         Cardiovascular   cardiovascular exam normal          Pulmonary   pulmonary exam normal                OUTSIDE LABS:  CBC:   Lab Results   Component Value Date    WBC 8.3 2023    WBC 5.4 2019    HGB 11.6 (L) 2023    HGB 11.5 (L) 2023    HCT 33.8 (L) 2023    HCT 38.6 2019     2023     2019     BMP:   Lab Results   Component Value Date     2019    POTASSIUM 4.0 2019    CHLORIDE 106 2019    CO2 25 2019    BUN 9  01/28/2019    CR 0.76 01/28/2019    GLC 83 01/28/2019     COAGS: No results found for: PTT, INR, FIBR  POC:   Lab Results   Component Value Date    HCG Positive (A) 02/07/2023     HEPATIC: No results found for: ALBUMIN, PROTTOTAL, ALT, AST, GGT, ALKPHOS, BILITOTAL, BILIDIRECT, LILLIANA  OTHER:   Lab Results   Component Value Date    DANIE 9.7 01/28/2019    TSH 3.54 07/06/2023    CRP 0.6 01/28/2019    SED 13 01/28/2019       Anesthesia Plan    ASA Status:  2, emergent       Anesthesia Type: Epidural.              Consents    Anesthesia Plan(s) and associated risks, benefits, and realistic alternatives discussed. Questions answered and patient/representative(s) expressed understanding.     - Discussed:     - Discussed with:  Spouse, Patient            Postoperative Care            Comments:    Other Comments: Epidural has been working well for labor, plan to use for OR.    Emergent section for fetal distress per surgeon            Manny Menjivar MD

## 2023-09-02 NOTE — H&P
[facility]    History and Physical  Obstetrics and Gynecology     Date of Admission:  2023    Assessment & Plan   Rubi Mehta is a 33 year old female who presents with SROM without labor.  ASSESSMENT:   IUP @ 36w3d not in labor.  NST reactive.  Category  I    PLAN:   Admit - see IP orders  cervical ripening with misoprostol  Pain medication PRN  Theraputic sleep  GBS unknown- starting prophylaxis with PCN  Anticipate     Michelle Moctezuma MD    History of Present Illness   Rubi Mehta is a 33 year old female  36w3d  Estimated Date of Delivery: Sep 26, 2023 is calculated from Patient's last menstrual period was 2022 (exact date). is [facility]  ruptured with no labor.    PRENATAL COURSE  Prenatal course was essentially uncomplicated      Recent Labs   Lab Test 23  1809   AS Negative     Rhogam not indicated   Recent Labs   Lab Test 23  1208   HEPBANG Nonreactive   HIAGAB Nonreactive   RUQIGG Positive       Past Medical History    I have reviewed this patient's medical history and updated it with pertinent information if needed.   Past Medical History:   Diagnosis Date    Hyperthyroidism     Hyperthyroidism     in [place] treated with PTU for 4 years       Past Surgical History   I have reviewed this patient's surgical history and updated it with pertinent information if needed.  Past Surgical History:   Procedure Laterality Date    WISDOM TOOTH EXTRACTION         Prior to Admission Medications   Prior to Admission Medications   Prescriptions Last Dose Informant Patient Reported? Taking?   FERROUS BISGLYCINATE CHELATE PO Past Month  Yes Yes   Sig: Take 25 mg by mouth daily   Prenatal Vit-Fe Fumarate-FA (PRENATAL MULTIVITAMIN W/IRON) 27-0.8 MG tablet 2023  Yes Yes   Sig: Take 1 tablet by mouth daily      Facility-Administered Medications: None     Allergies   Allergies   Allergen Reactions    Propylthiouracil Hives       Social History    I have reviewed this patient's social history and updated it with pertinent information if needed. Rubi Mehta  reports that she has never smoked. She has never used smokeless tobacco. She reports that she does not currently use alcohol. She reports that she does not use drugs.    Family History   I have reviewed this patient's family history and updated it with pertinent information if needed.   Family History   Problem Relation Age of Onset    Hypertension Mother     Diabetes Father     Colon Cancer Father 75    Diabetes Maternal Grandmother     Hypertension Maternal Grandmother     Cancer Maternal Grandfather         nasopharyngeal    Hypertension Maternal Aunt     Hypertension Maternal Uncle     Cancer Paternal Uncle         gastric    Breast Cancer No family hx of     Heart Disease No family hx of        Immunization History   Immunization History   Administered Date(s) Administered    COVID-19 Bivalent 18+ (Moderna) 09/14/2022    COVID-19 MONOVALENT 12+ (Pfizer) 10/25/2021    COVID-19 Vaccine (Cherelle) 03/09/2021    Flu, Unspecified 12/19/2006    HEPATITIS A (PEDS 12M-18Y) 07/09/2007    HPV Quadrivalent 02/06/2008, 06/18/2008, 07/09/2010    Hepatitis A (ADULT 19+) 04/25/2016    Influenza (IIV3) PF 09/27/2011    Influenza Vaccine >6 months (Alfuria,Fluzone) 02/12/2020, 10/25/2021    Influenza Vaccine, 6+MO IM (QUADRIVALENT W/PRESERVATIVES) 10/17/2018    Influenza,INJ,MDCK,PF,Quad >6mo(Flucelvax) 09/14/2022    TDAP (Adacel,Boostrix) 10/03/2012, 07/20/2023       Physical Exam   Temp: 98  F (36.7  C) Temp src: Oral BP: 129/78     Resp: 18        Vital Signs with Ranges  Temp:  [98  F (36.7  C)-98.3  F (36.8  C)] 98  F (36.7  C)  Resp:  [18] 18  BP: (129)/(78) 129/78    Abdomen: gravid, single vertex fetus, non-tender  Cervical Exam: 1.5/ 50/ Mid/ average/ -1     Fetal Heart Tones: moderate variablility, + accels, no decels, and Category I  TOCO:   frequency q 4-6 minutes, mild    Constitutional: healthy,  alert, and active   Respiratory: no increased work of breathing, no cough  Cardiovascular: no edema  Skin/Extremites: normal skin color, texture, turgor  Neurologic: alert and oriented x3  Neuropsychiatric: normal mood and affect

## 2023-09-03 LAB — HGB BLD-MCNC: 10.2 G/DL (ref 11.7–15.7)

## 2023-09-03 PROCEDURE — 250N000011 HC RX IP 250 OP 636: Performed by: OBSTETRICS & GYNECOLOGY

## 2023-09-03 PROCEDURE — 85018 HEMOGLOBIN: CPT | Performed by: OBSTETRICS & GYNECOLOGY

## 2023-09-03 PROCEDURE — 36415 COLL VENOUS BLD VENIPUNCTURE: CPT | Performed by: OBSTETRICS & GYNECOLOGY

## 2023-09-03 PROCEDURE — 120N000001 HC R&B MED SURG/OB

## 2023-09-03 PROCEDURE — 250N000013 HC RX MED GY IP 250 OP 250 PS 637: Performed by: OBSTETRICS & GYNECOLOGY

## 2023-09-03 RX ADMIN — KETOROLAC TROMETHAMINE 30 MG: 30 INJECTION INTRAMUSCULAR; INTRAVENOUS at 14:55

## 2023-09-03 RX ADMIN — ACETAMINOPHEN 975 MG: 325 TABLET ORAL at 08:08

## 2023-09-03 RX ADMIN — SENNOSIDES AND DOCUSATE SODIUM 1 TABLET: 50; 8.6 TABLET ORAL at 08:09

## 2023-09-03 RX ADMIN — SENNOSIDES AND DOCUSATE SODIUM 2 TABLET: 50; 8.6 TABLET ORAL at 22:33

## 2023-09-03 RX ADMIN — KETOROLAC TROMETHAMINE 30 MG: 30 INJECTION INTRAMUSCULAR; INTRAVENOUS at 08:18

## 2023-09-03 RX ADMIN — ACETAMINOPHEN 975 MG: 325 TABLET ORAL at 02:33

## 2023-09-03 RX ADMIN — ACETAMINOPHEN 975 MG: 325 TABLET ORAL at 22:33

## 2023-09-03 RX ADMIN — IBUPROFEN 800 MG: 800 TABLET ORAL at 22:33

## 2023-09-03 RX ADMIN — ACETAMINOPHEN 975 MG: 325 TABLET ORAL at 14:55

## 2023-09-03 RX ADMIN — KETOROLAC TROMETHAMINE 30 MG: 30 INJECTION INTRAMUSCULAR; INTRAVENOUS at 02:34

## 2023-09-03 ASSESSMENT — ACTIVITIES OF DAILY LIVING (ADL)
ADLS_ACUITY_SCORE: 18

## 2023-09-03 NOTE — OP NOTE
PROCEDURE NOTE:      NAME:  Rubi Mehta   RECORD # [id number]   ADMIT DATE: 2023    DATE OF SERVICE: 2023     PREOPERATIVE DIAGNOSIS:  Failure to descend.  NRFHT's.  PPROM at 36 wks.      PROCEDURE: Low transverse  section      SURGEON:  Celena Hodgson DO, FACOG     ASSISTANT:  Michelle Moctezuma MD    ANESTHESIA:  Epidural    Delivery QBL (mL): 523     DRAINS: Wilson catheter.    COMPLICATIONS: none apparent    FINDINGS: Normal uterus, tubes and ovaries bilateral. Normal appearance to the adnexae.  Live male infant born, Apgars of 8   at 1 minute, 9  at 5 minutes,  weight unavailable at time of dictation    CONSENT: Patient was met preoperatively where we discussed the procedure and the risks associated with the procedure.  She understood these to include but not limited to injury to adjacent organs including bowel, bladder, ureter, infection and bleeding. Understanding these risks her consents were signed.      PROCEDURE: Patient was brought to the operating room in stable condition.  After induction of an epidural bolus, fetal heart tones were checked and were stable. She was prepped and draped in sterile fashion for the procedure.  A timeout was then performed.      A pfannensteil skin incision was made to the level of the fascia.  The fascia was incised laterally. Kocher clamps were applied to the superior aspect of the incision which was sharply dissected from the rectus muscles.  The kocher clamps were then reapplied to the inferior aspect of the incision which was similarly sharply dissected from the rectus muscles.  The rectus muscles were  bluntly in the midline.  The peritoneum was entered sharply. This incision was then extended.  A bladder blade was introduced. The vesicouterine peritoneum was identified and a bladder flap was was then created.  A low transverse uterine incision was made and amniotic sac was ruptured revealing clear amniotic fluid.  The baby's  head was then delivered.There was a nuchal cord noted. There was a spontaneous cry and therefore bulb suction was performed. The remainder of the infant was easily delivered. The cord was clamped x 2 and cut and the infant handed off to waiting nursing personnel.    The placenta was then manually removed from the uterus.  The uterus was exteriorized, covered with a moist laparotomy sponge and cleared of all clots and debris.  The uterine incision was closed with 0 vicryl from both angles in a running locking suture, 0 chromic from both angles in a running locking suture, and  A second imbricating suture of 0 was used for hemostasis. The uterus was returned to the abdominopelvic cavity.  The pericolic gutters were cleared of all clots and debris.  The uterine incision was again inspected and noted to be hemostatic.  The rectus muscles were made hemostatic with the use of electrocautery.  The fascia was brought together with vicryl from both angles in a running nonlocking suture, met at the midline, the subcutaneous tissues were irrigated, made hemostatic with use of electrocautery and brought together with 3-0 plain.  Skin was closed with suture.  Patient tolerated this procedure well.  Sponge, lap and needle counts were correct x two.      Celena Hodgson DO, FACOG  9/2/2023 8:22 PM         CC: Michelle Moctezuma Megan Lynn McEllistrem, MD

## 2023-09-03 NOTE — LACTATION NOTE
"This note was copied from a baby's chart.  Writer (lactation consultant) to pt room to offer support and assist with the most recent feeding. Mother was assisted with hand expression and positioning  in the \"football\" hold at breast. Hendricks was able to sustain a latch off/on for a few minutes at a time, no audible swallows noted. Techniques to keep  active at breast including deep breast compression was demonstrated. Donor milk supplementation and pumping recommended. The plan of care (below) for a late / low birth weight  was provided and reviewed with parents.     Paced bottle feeding demonstrated. Explained need to keep feedings efficient, if  is not transferring within 5-10 minutes at breast, pump and give supplement. The hospital breast pump was set up (instructions on use and cleaning of parts explained).     Questions answered. Benefits of skin-to-skin reviewed. Encouragement, reassurance, and emotional support provided. Lactation to follow up as needed.     Care Plan Breastfeeding Late /Low Birth Weight Baby   May struggle to sustain a latch due to thinner fat pads in cheeks  May have decreased energy to stay at breast long enough to get enough milk  Decreased milk transfer over time will result in infant weight loss and low milk supply    Feeding Plan  Hand express, as needed  Breastfeed 8-12+ times in 24 hours  Watch for:   Rhythmic sucking and swallowing during feeding  Content baby after feeding  Adequate wet & dirty diapers (per feeding log)    Supplement If infant does not latch or is sleepy at breast, end breastfeeding and feed expressed milk using the amounts below as a guideline; give more as baby cues. If necessary, make up the difference with donor milk or formula as a bridge until milk supply increases:    0-24 hours 2-10 ml each feeding  24-48 hours 5-15 ml each feeding  48-72 hours 15-30 ml each feeding  72-96 hours 30-60 ml each feeding    Pump after " feedings to stimulate milk production until milk supply well established & baby breastfeeding with rhythmic sucking and swallowing (10-20 minutes), adequate output, and weight gain.    Contact Outpatient Lactation Clinic after discharge as needed.  [phone]

## 2023-09-03 NOTE — PROGRESS NOTES
"POSTPARTUM DAY #1/     Subjective:  The patient feels well. The patient has no emotional concerns. Pain is well controlled with current medications. Patient is taking oral medication. The patient has been up since her c section. She has had the catheter removed. She is passing gas.The amount and color of the lochia is appropriate for the duration of recovery, patient denies passing clots. The baby is well.    Objective   The patient has a blood pressure which is within the normal range. Urinary output is adequate.     Exam:   /65   Pulse 92   Temp 98  F (36.7  C) (Oral)   Resp 16   Ht 1.588 m (5' 2.5\")   Wt 68.5 kg (151 lb)   LMP 2022 (Exact Date)   SpO2 93%   Breastfeeding Unknown   BMI 27.18 kg/m    General: NAD, alert and orientated   Abdomen: soft, NT   Fundus: firm, nontender  Incision: covered, C/D/I  Ext: no pain     LAB:  Lab Results   Component Value Date    HGB 10.2 (L) 2023         I/Os    Intake/Output Summary (Last 24 hours) at 9/3/2023 0828  Last data filed at 9/3/2023 0640  Gross per 24 hour   Intake 1000 ml   Output 2060 ml   Net -1060 ml         Impression:   Normal postpartum course, POD#1 LTCS secondary to non-reassuring fetal status      Plan:   CPC    POSTOPERATIVE   - DC catheter if it has not already been  - switch to oral pain meds  - encourage ambulation   - Continue current care        Braydon Miranda MD      "

## 2023-09-03 NOTE — ANESTHESIA CARE TRANSFER NOTE
Patient: Rubi Mehta    Procedure: Procedure(s):   SECTION       Diagnosis: Failure to progress in labor [O62.2]  Diagnosis Additional Information: No value filed.    Anesthesia Type:   Epidural     Note:    Oropharynx: oropharynx clear of all foreign objects and spontaneously breathing  Level of Consciousness: drowsy  Oxygen Supplementation: room air    Independent Airway: airway patency satisfactory and stable  Dentition: dentition unchanged  Vital Signs Stable: post-procedure vital signs reviewed and stable  Report to RN Given: handoff report given  Patient transferred to: Labor and Delivery    Handoff Report: Identifed the Patient, Identified the Reponsible Provider, Reviewed the pertinent medical history, Discussed the surgical course, Reviewed Intra-OP anesthesia mangement and issues during anesthesia, Set expectations for post-procedure period and Allowed opportunity for questions and acknowledgement of understanding      Vitals:  Vitals Value Taken Time   BP 98/52 23   Temp 36.2  C (97.1  F) 23   Pulse 92 23   Resp 16 23   SpO2 95 % 23       Electronically Signed By: RADHA Arias CRNA  2023  8:43 PM

## 2023-09-03 NOTE — PROGRESS NOTES
OB Labor Progress    Patient continues to be comfortable with epidural.  Contractions have been adequate for 2 hours without a change in station, patient recently had a 4th prolonged deceleration along with recurrent late decelerations, resolved with discontinuation of pitocin and position changes, baby now tachycardic in the 160's.  Consulted [facility] Dr. Hodgson for  delivery for fetal intolerance to labor, patient is willing to proceed.    Time spent at bedside 5 hours.    Michelle Moctezuma MD

## 2023-09-03 NOTE — PLAN OF CARE
Goal Outcome Evaluation:       Pt's vs and assessments.   Bleeding  minimal, fundus firm and midline.   Pain managed with current pain medications.        Pt up and ambulated the bathroom.  Wilson removed at 0640.

## 2023-09-03 NOTE — PLAN OF CARE
Problem: Plan of Care - These are the overarching goals to be used throughout the patient stay.    Goal: Plan of Care Review  Description: The Plan of Care Review/Shift note should be completed every shift.  The Outcome Evaluation is a brief statement about your assessment that the patient is improving, declining, or no change.  This information will be displayed automatically on your shift note.  Outcome: Progressing  Flowsheets (Taken 9/3/2023 1843)  Plan of Care Reviewed With: patient   Goal Outcome Evaluation:      Plan of Care Reviewed With: patient           VSS    Postpartum assessment WNL:  Fundus firm at umbilicus  Lochia scant   Incision covered with mepilex, CDI  Minimal pain managed with scheduled tylenol and toradol    Breastfeeding, lactation seen. Started pumping with supplementations to infant of donor milk.

## 2023-09-03 NOTE — ANESTHESIA POSTPROCEDURE EVALUATION
Patient: Rubi Mehta    Procedure: Procedure(s):   SECTION       Anesthesia Type:  Epidural    Note:  Disposition: Inpatient   Postop Pain Control: Uneventful            Sign Out: Well controlled pain   PONV: No   Neuro/Psych: Uneventful            Sign Out: Acceptable/Baseline neuro status   Airway/Respiratory: Uneventful            Sign Out: Acceptable/Baseline resp. status   CV/Hemodynamics: Uneventful            Sign Out: Acceptable CV status; No obvious hypovolemia; No obvious fluid overload   Other NRE: NONE   DID A NON-ROUTINE EVENT OCCUR? No     Epidural-to- Updated ASA: 2      Last vitals:  Vitals:    23 2135 23   BP: 105/57     Pulse:      Resp:      Temp:      SpO2: 97% 97% 97%       Electronically Signed By: Angelica Mercado MD  2023  10:01 PM

## 2023-09-03 NOTE — CONSULTS
Rubi Mehta is a 33 year old female.      I have reviewed the patient's history and labor course.  After a complete review, decision was made to move forward with  delivery.     Risks of infection, bleeding, damage to bladder/bowel/large blood vessels gone over.  Additional risks of scratch on baby and other difficulties with delivery reviewed.     All questions answered.    Informed consent obtained and we will proceed.      Celena Hodgson DO, FACOG  2023 7:12 PM

## 2023-09-03 NOTE — PROGRESS NOTES
I was able to assist with the  section for my patient Rubi Mehta with primary surgeon Dr. Hodgson.  See operative note.    Total time spent at bedside today 6 hours.    Michelle Moctezuma MD

## 2023-09-04 PROCEDURE — 250N000013 HC RX MED GY IP 250 OP 250 PS 637: Performed by: OBSTETRICS & GYNECOLOGY

## 2023-09-04 PROCEDURE — 120N000001 HC R&B MED SURG/OB

## 2023-09-04 RX ADMIN — ACETAMINOPHEN 975 MG: 325 TABLET ORAL at 04:38

## 2023-09-04 RX ADMIN — ACETAMINOPHEN 975 MG: 325 TABLET ORAL at 16:15

## 2023-09-04 RX ADMIN — IBUPROFEN 800 MG: 800 TABLET ORAL at 04:39

## 2023-09-04 RX ADMIN — IBUPROFEN 800 MG: 800 TABLET ORAL at 16:15

## 2023-09-04 RX ADMIN — IBUPROFEN 800 MG: 800 TABLET ORAL at 22:20

## 2023-09-04 RX ADMIN — ACETAMINOPHEN 975 MG: 325 TABLET ORAL at 22:20

## 2023-09-04 RX ADMIN — SENNOSIDES AND DOCUSATE SODIUM 2 TABLET: 50; 8.6 TABLET ORAL at 22:19

## 2023-09-04 RX ADMIN — ACETAMINOPHEN 975 MG: 325 TABLET ORAL at 10:34

## 2023-09-04 RX ADMIN — SENNOSIDES AND DOCUSATE SODIUM 1 TABLET: 50; 8.6 TABLET ORAL at 08:38

## 2023-09-04 RX ADMIN — IBUPROFEN 800 MG: 800 TABLET ORAL at 10:34

## 2023-09-04 ASSESSMENT — ACTIVITIES OF DAILY LIVING (ADL)
ADLS_ACUITY_SCORE: 18

## 2023-09-04 NOTE — PLAN OF CARE
Goal Outcome Evaluation:      Plan of Care Reviewed With: patient               VSS  Postpartum assessment WNL:  Fundus firm, -1U  Lochia light/scant  Incision covered, CDI, 1 small spot old blood  Pain minimal 2/10- medicated with scheduled tylenol and ibuprofen  Voiding  Tolerating diet  Ambulated, encouraged ambulation  Showered  Breastfeeding baby, and utilizing SNS system with donor milk introduced by lactation. Going well. Continuing to pump after supplementing.

## 2023-09-04 NOTE — PLAN OF CARE
Problem: Plan of Care - These are the overarching goals to be used throughout the patient stay.    Goal: Plan of Care Review  Description: The Plan of Care Review/Shift note should be completed every shift.  The Outcome Evaluation is a brief statement about your assessment that the patient is improving, declining, or no change.  This information will be displayed automatically on your shift note.  Outcome: Progressing  Flowsheets (Taken 9/4/2023 1832)  Plan of Care Reviewed With: patient    Bonding with baby, requesting staff wear a mask per request. Breast feeding working with SNS.   Goal Outcome Evaluation:      Plan of Care Reviewed With: patient        Greer Lopez RN on 9/4/2023 at 6:33 PM

## 2023-09-04 NOTE — PLAN OF CARE
Patient vital signs are stable and assessment findings were within normal range. Pain is being managed with tylenol and ibuprofen. Patient is voiding, ambulating in room, and passing gas. Patient is very attentive to infant and is pumping after SNS feedings.    Jessi Oliveira RN  9/4/2023 5:18 AM

## 2023-09-04 NOTE — LACTATION NOTE
"This note was copied from a baby's chart.  Writer (lactation consultant) to pt room for the most recent feeding. Mother had  to breast in the \"football\" hold on the left side. A supplemental nursing system is being used with donor milk. This writer showed parents how to use the SNS last evening, and they report that they used it a few times during the night. Mother states that she likes keeping  at the breast for the duration of the feeding. Harwood took in 15 ml of donor milk while at breast with the SNS at the most recent feeding.     Writer assisted with adjusting positioning/pillows for comfort. Praise, positive reinforcement, and encouragement provided. Mother instructed to continue pumping after feedings to promote an optimal milk supply. Questions answered. Primary RN updated. Lactation to follow up as needed.   "

## 2023-09-05 ENCOUNTER — LACTATION ENCOUNTER (OUTPATIENT)
Age: 33
End: 2023-09-05

## 2023-09-05 VITALS
OXYGEN SATURATION: 99 % | RESPIRATION RATE: 18 BRPM | WEIGHT: 151.1 LBS | BODY MASS INDEX: 26.77 KG/M2 | SYSTOLIC BLOOD PRESSURE: 119 MMHG | DIASTOLIC BLOOD PRESSURE: 79 MMHG | TEMPERATURE: 98.5 F | HEIGHT: 63 IN | HEART RATE: 71 BPM

## 2023-09-05 PROBLEM — Z98.891 S/P PRIMARY LOW TRANSVERSE C-SECTION: Status: ACTIVE | Noted: 2023-09-05

## 2023-09-05 PROCEDURE — 250N000013 HC RX MED GY IP 250 OP 250 PS 637: Performed by: OBSTETRICS & GYNECOLOGY

## 2023-09-05 RX ORDER — AMOXICILLIN 250 MG
1 CAPSULE ORAL 2 TIMES DAILY
Qty: 30 TABLET | Refills: 0 | Status: SHIPPED | OUTPATIENT
Start: 2023-09-05 | End: 2023-10-10

## 2023-09-05 RX ORDER — AMOXICILLIN 250 MG
1 CAPSULE ORAL 2 TIMES DAILY
Qty: 30 TABLET | Refills: 0 | Status: SHIPPED | OUTPATIENT
Start: 2023-09-05 | End: 2023-09-05

## 2023-09-05 RX ORDER — OXYCODONE HYDROCHLORIDE 5 MG/1
5 TABLET ORAL EVERY 6 HOURS PRN
Qty: 10 TABLET | Refills: 0 | Status: SHIPPED | OUTPATIENT
Start: 2023-09-05 | End: 2023-09-05

## 2023-09-05 RX ORDER — IBUPROFEN 800 MG/1
800 TABLET, FILM COATED ORAL EVERY 6 HOURS
Start: 2023-09-05 | End: 2023-10-10

## 2023-09-05 RX ORDER — OXYCODONE HYDROCHLORIDE 5 MG/1
5 TABLET ORAL EVERY 6 HOURS PRN
Qty: 10 TABLET | Refills: 0 | Status: SHIPPED | OUTPATIENT
Start: 2023-09-05 | End: 2023-10-10

## 2023-09-05 RX ORDER — ACETAMINOPHEN 325 MG/1
975 TABLET ORAL EVERY 6 HOURS
Start: 2023-09-05 | End: 2023-10-10

## 2023-09-05 RX ADMIN — SENNOSIDES AND DOCUSATE SODIUM 1 TABLET: 50; 8.6 TABLET ORAL at 10:46

## 2023-09-05 RX ADMIN — ACETAMINOPHEN 975 MG: 325 TABLET ORAL at 10:45

## 2023-09-05 RX ADMIN — ACETAMINOPHEN 975 MG: 325 TABLET ORAL at 04:38

## 2023-09-05 RX ADMIN — IBUPROFEN 800 MG: 800 TABLET ORAL at 04:07

## 2023-09-05 RX ADMIN — IBUPROFEN 800 MG: 800 TABLET ORAL at 10:46

## 2023-09-05 ASSESSMENT — ACTIVITIES OF DAILY LIVING (ADL)
ADLS_ACUITY_SCORE: 18

## 2023-09-05 NOTE — DISCHARGE INSTRUCTIONS
*Keep and use next OB appointment on Thursday,  - bring baby to clinic for  check.       Warning Signs after Having a Baby    Keep this paper on your fridge or somewhere else where you can see it.    Call your provider if you have any of these symptoms up to 12 weeks after having your baby.    Thoughts of hurting yourself or your baby  Pain in your chest or trouble breathing  Severe headache not helped by pain medicine  Eyesight concerns (blurry vision, seeing spots or flashes of light, other changes to eyesight)  Fainting, shaking or other signs of a seizure    Call  if you feel that it is an emergency.     The symptoms below can happen to anyone after giving birth. They can be very serious. Call your provider if you have any of these warning signs.    My provider s phone number: _______________________    Losing too much blood (hemorrhage)    Call your provider if you soak through a pad in less than an hour or pass blood clots bigger than a golf ball. These may be signs that you are bleeding too much.    Blood clots in the legs or lungs    After you give birth, your body naturally clots its blood to help prevent blood loss. Sometimes this increased clotting can happen in other areas of the body, like the legs or lungs. This can block your blood flow and be very dangerous.     Call your provider if you:  Have a red, swollen spot on the back of your leg that is warm or painful when you touch it.   Are coughing up blood.     Infection    Call your provider if you have any of these symptoms:  Fever of 100.4 F (38 C) or higher.  Pain or redness around your stitches if you had an incision.   Any yellow, white, or green fluid coming from places where you had stitches or surgery.    Mood Problems (postpartum depression)    Many people feel sad or have mood changes after having a baby. But for some people, these mood swings are worse.     Call your provider right away if you feel so anxious or nervous that  "Subjective:       Patient ID: Albert Mckeon Person is a 72 y.o. male.    Chief Complaint: Annual Exam    HPI   Patient in the office for his annual exam.  Sees podiatry intermittently for arthritis in feet. Told to take nsaids prn.   EGD 3/2017 rev. Some dilation at that time. 1 recurrence of dysphagia since. Under active monitoring.   Labs 9/2017 rev.  Takes periodic tramadol for chronic neck pain. Does have HA less frequently.   Using alb intermittently for cough. Has not tried to use for the nighttime cough that occurs.    Past Medical History:   Diagnosis Date    Anxiety     From previous records    Asthma     hx of environmental    Person esophagus     From previous records    Basal cell carcinoma     L cheek & R temple mid 1990"s     Cervical disc disorder with radiculopathy of cervical region     spinal stenosis    GERD (gastroesophageal reflux disease)     History of rheumatic fever 1962    Hyperlipidemia     Hypertension        Current Outpatient Prescriptions:     ALBUTEROL SULFATE (VENTOLIN HFA INHL), Inhale into the lungs., Disp: , Rfl:     aspirin (ECOTRIN) 81 MG EC tablet, Take 81 mg by mouth once daily., Disp: , Rfl:     cholecalciferol, vitamin D3, (VITAMIN D3) 2,000 unit Cap, Take 1 capsule by mouth once daily., Disp: , Rfl:     escitalopram oxalate (LEXAPRO) 10 MG tablet, Pt will take 1/2 of a tablet mon, wed, fri, and sun. And a whole tablet on tues, thurs and sat., Disp: 45 tablet, Rfl: 3    fluticasone (FLONASE) 50 mcg/actuation nasal spray, 1 spray by Each Nare route once daily., Disp: , Rfl:     HYDROcodone-acetaminophen 5-300 mg Tab, Take by mouth., Disp: , Rfl:     hydrocodone-homatropine 5-1.5 mg/5 ml (HYCODAN) 5-1.5 mg/5 mL Syrp, Take 5 mLs by mouth every 6 (six) hours as needed., Disp: 120 mL, Rfl: 0    ibuprofen (ADVIL,MOTRIN) 200 MG tablet, Take 400 mg by mouth every 6 (six) hours as needed for Pain., Disp: , Rfl:     losartan (COZAAR) 50 MG tablet, Take 1 tablet (50 " mg total) by mouth 2 (two) times daily., Disp: 180 tablet, Rfl: 0    multivitamin capsule, Take 1 capsule by mouth once daily., Disp: , Rfl:     ranitidine (ZANTAC) 150 MG tablet, Take 1 tablet (150 mg total) by mouth 2 (two) times daily., Disp: 180 tablet, Rfl: 3    simvastatin (ZOCOR) 40 MG tablet, TAKE 1 TABLET BY MOUTH EVERY EVENING, Disp: 90 tablet, Rfl: 0    tramadol (ULTRAM) 50 mg tablet, Take 50 mg by mouth every 6 (six) hours as needed for Pain., Disp: , Rfl:     triamcinolone acetonide 0.1% (KENALOG) 0.1 % cream, aaa bid x 1-2 weeks then prn, avoid chronic use., Disp: 454 g, Rfl: 3    buPROPion (WELLBUTRIN XL) 150 MG TB24 tablet, Take 1 tablet (150 mg total) by mouth once daily., Disp: 30 tablet, Rfl: 1    CA/D3/MAG OX/ZINC//LAURI/BOR (CALCIUM 600+D3 PLUS ORAL), Take by mouth once daily., Disp: , Rfl:     The 10-year ASCVD risk score (Shanel ELPIDIO Jr., et al., 2013) is: 20.3%    Values used to calculate the score:      Age: 72 years      Sex: Male      Is Non- : No      Diabetic: No      Tobacco smoker: No      Systolic Blood Pressure: 134 mmHg      Is BP treated: Yes      HDL Cholesterol: 42 mg/dL      Total Cholesterol: 138 mg/dL     Lab Results   Component Value Date    HGBA1C 5.4 09/12/2017     Lab Results   Component Value Date    LDLCALC 77.4 09/12/2017    CREATININE 1.2 09/12/2017       Review of Systems   Constitutional: Positive for activity change. Negative for unexpected weight change.   HENT: Negative for congestion, hearing loss, rhinorrhea and trouble swallowing.    Eyes: Negative for discharge and visual disturbance.   Respiratory: Positive for cough (periodic nighttime cough). Negative for chest tightness and wheezing.    Cardiovascular: Negative for chest pain, palpitations and leg swelling.   Gastrointestinal: Positive for constipation. Negative for blood in stool and diarrhea.   Endocrine: Negative for polydipsia and polyuria.   Genitourinary: Negative for  you can't care for yourself or your baby.    Preeclampsia (high blood pressure)    Even if you didn't have high blood pressure when you were pregnant, you are at risk for the high blood pressure disease called preeclampsia. This risk can last up to 12 weeks after giving birth.     Call your provider if you have:   Pain on your right side under your rib cage  Sudden swelling in the hands and face    Remember: You know your body. If something doesn't feel right, get medical help.     For informational purposes only. Not to replace the advice of your health care provider. Copyright 2020 [place] [facility]. All rights reserved. Clinically reviewed by Lisa Burr, RNC-OB, MSN. [employer] 730109 - Rev 02/23.       difficulty urinating, frequency (nighttime 0-2x), hematuria and urgency.   Musculoskeletal: Positive for arthralgias and joint swelling.   Neurological: Positive for headaches. Negative for dizziness and weakness.   Psychiatric/Behavioral: Negative for confusion, dysphoric mood and sleep disturbance.       Objective:      Physical Exam   Constitutional: He is oriented to person, place, and time. He appears well-developed and well-nourished.   HENT:   Head: Normocephalic and atraumatic.   Nose: Nose normal.   Mouth/Throat: Oropharynx is clear and moist. No oropharyngeal exudate.   Eyes: Conjunctivae and EOM are normal. Pupils are equal, round, and reactive to light. No scleral icterus.   Neck: Normal range of motion. Neck supple. No thyromegaly present.   Cardiovascular: Normal rate, regular rhythm and normal heart sounds.  Exam reveals no gallop and no friction rub.    No murmur heard.  Pulmonary/Chest: Effort normal and breath sounds normal. No respiratory distress. He has no wheezes. He has no rales.   Abdominal: Soft. Bowel sounds are normal. He exhibits no distension. There is no tenderness. There is no guarding.   Musculoskeletal: Normal range of motion. He exhibits no edema.   Neurological: He is alert and oriented to person, place, and time. He has normal reflexes. No cranial nerve deficit.   Skin: Skin is warm and dry.   Psychiatric: He has a normal mood and affect. His behavior is normal.   Nursing note and vitals reviewed.        Routine general medical examination at a health care facility  -     CBC auto differential; Future; Expected date: 03/15/2018  -     Comprehensive metabolic panel; Future; Expected date: 03/15/2018  -     Lipid panel; Future; Expected date: 03/15/2018  -     TSH; Future; Expected date: 03/15/2018  -     Prostate Specific Antigen, Diagnostic; Future; Expected date: 03/15/2018  Anticipatory guidance reviewed.  Hyperlipidemia, unspecified hyperlipidemia type  -     simvastatin  (ZOCOR) 40 MG tablet; Take 1 tablet (40 mg total) by mouth every evening.  Dispense: 90 tablet; Refill: 3  -     IN OFFICE EKG 12-LEAD (to Muse)  Cont regimen.  Essential hypertension  -     losartan (COZAAR) 50 MG tablet; Take 1 tablet (50 mg total) by mouth 2 (two) times daily.  Dispense: 180 tablet; Refill: 3  -     IN OFFICE EKG 12-LEAD (to Muse)  Controlled, cont.  Gastroesophageal reflux disease, esophagitis presence not specified  Reviewed risks ass'd with long-term, high-dose ppi use. Avoid trigger foods.  Viral URI with cough  Expected course of illness and sx tx incl otc med use reviewed. Notify MD if sx persist or worsen.   Albuterol at night prn. If consistent use, will need to re-address.  Anxiety   -     escitalopram oxalate (LEXAPRO) 10 MG tablet; Pt will take 1/2 of a tablet mon, wed, fri, and sun. And a whole tablet on tues, thurs and sat.  Dispense: 90 tablet; Refill: 3  Cont current.  DDD, cervical  -     tiZANidine (ZANAFLEX) 2 MG tablet; Take 2 tablets (4 mg total) by mouth every 8 (eight) hours as needed.  Dispense: 60 tablet; Refill: 0  Side effects and precautions of medication use reviewed with patient, expressed understanding. No questions or concerns.   -     albuterol 90 mcg/actuation inhaler; Inhale 2 puffs into the lungs every 6 (six) hours as needed for Wheezing. Rescue  Dispense: 18 g; Refill: 3

## 2023-09-05 NOTE — DISCHARGE SUMMARY
OB DISCHARGE SUMMARY    ADMISSION DATE:  2023    Rubi Mehta will be discharged  today to home in good condition.    PRINCIPAL DIAGNOSIS:  Intrauterine pregnancy at term, delivered    FINAL DIAGNOSES (choose all that apply):  Arrest of descent, fetal intolerance to labor, s/p primary low segmentary  section    PROCEDURES PERFORMED: Low segmentary  section, double layer closure    INTRA-PARTUM COMPLICATIONS:  none    ABNORMAL LABS:    Recent Results (from the past 120 hour(s))   Group B strep PCR    Collection Time: 23  5:01 PM    Specimen: Rectovaginal; Swab   Result Value Ref Range    Group B Strep PCR Negative Negative   Fern Test for Rupture of Membranes    Collection Time: 23  5:02 PM   Result Value Ref Range    Fern Crystallization Ferning present (A) No ferning present   Hemoglobin    Collection Time: 23  6:09 PM   Result Value Ref Range    Hemoglobin 11.6 (L) 11.7 - 15.7 g/dL   Treponema Abs w Reflex to RPR and Titer    Collection Time: 23  6:09 PM   Result Value Ref Range    Treponema Antibody Total Nonreactive Nonreactive   Adult Type and Screen    Collection Time: 23  6:09 PM   Result Value Ref Range    ABO/RH(D) B POS     Antibody Screen Negative Negative    SPECIMEN EXPIRATION DATE [id number]    Hemoglobin    Collection Time: 23  7:22 PM   Result Value Ref Range    Hemoglobin 11.8 11.7 - 15.7 g/dL   Extra Red Top Tube    Collection Time: 23  7:22 PM   Result Value Ref Range    Hold Specimen JIC    Hemoglobin    Collection Time: 23  5:33 AM   Result Value Ref Range    Hemoglobin 10.2 (L) 11.7 - 15.7 g/dL         POST-PARTUM COMPLICATIONS: none    DIET:  regular  ACTIVITY:  as tolerated  RESTRICTIONS:  No lifting greater than 10 pounds for 2 weeks.                          Nothing per vagina for 6 weeks: no tampons, douches or intercourse                         No driving while taking narcotic medications.                          " Call if you have: chills, fever (>100.5  F), low abdominal pain, foul discharge or abnormal bleeding      saturating a pad in less than two hours.   CONTRACEPTION: will discuss with her doctor    FOLLOWUP:  2 and 6 weeks for a postpartum visit    Paula Mehta MD 9/5/2023\"   "

## 2023-09-05 NOTE — PROGRESS NOTES
[facility] RN Care Coordinator Note    Rubi Mehta  [id number]  1990    Chart reviewed, discharge follow-up plan discussed with patient, needs assessed. Patient requests all follow-up through clinic, declines home care visit. Post-delivery follow-up appointments planned in 2 & 6 weeks at OB clinic.     Patient, Rubi, states she has support at home and feels ready to discharge today with . RN Care Coordinator will continue to follow and assist if needed with discharge plan.

## 2023-09-05 NOTE — PLAN OF CARE
Problem: Plan of Care - These are the overarching goals to be used throughout the patient stay.    Goal: Plan of Care Review  Description: The Plan of Care Review/Shift note should be completed every shift.  The Outcome Evaluation is a brief statement about your assessment that the patient is improving, declining, or no change.  This information will be displayed automatically on your shift note.  Outcome: Adequate for Care Transition     Patient meets postpartum criteria to be discharged. Independent in her own cares. Educated on prescriptions, warning signs, and follow-up appointment. All questions and concerns answered at this time.

## 2023-09-05 NOTE — LACTATION NOTE
This note was copied from a baby's chart.  This writer met with Rubi just prior to discharge to discuss her feeding plan for infant.  She states infant had been using the SNS at the breast for most feedings.  She also had been pumping her breasts and obtained drops.  She had fed infant a supplement of donor milk with a bottle this morning.      Education given:  Premature infants do not have the suction power to pull the milk out of the breasts efficiently.  They can tire more easily at the breast and have thinner fat pads in their cheeks, therefore struggle to drain the breast well.  Over time, this can affect milk supply.  We reviewed the care plan below.  Infant should gain 1 oz per day.  From this writer's experience, most infants are 38-1/2 to 39 weeks corrected age and at least 6-1/2 pounds before they can effectively and efficiently drain the breast well and gain weight.  Infant is at 5# 1 oz today.  It may be 3 weeks before infant is able to transfer well at the breast for every feeding.  Rubi admits the SNS was held much higher than infant, allowing to SNS to flow freely and not requiring infant to suck well to transfer the food.        We discussed feedings need to be efficient, calories in needs to be greater than energy expenditure out.  This writer encouraged Rubi to bring infant to the breast for a short time (5-10 minutes) or when no longer hears infant's swallows or infant falls asleep, then offer a bottle and pump her breast to drain them.  A general overview of her Spectra gold breast pump given.  Instructed to view [facility] website for more information.      Rubi became teary and states the plan this writer discussed sounded like the best plan, as she is so very tired and feedings are taking almost an hour by the time she breast feeds and pumps.  She was encouraged to follow up at outpatient lactation clinic, [facility].  Rubi verbalizes understanding of all education given.  She  denies any further questions.  Much encouragement given.      Care Plan Breastfeeding Late /Low Birth Weight Baby   May struggle to sustain a latch due to thinner fat pads in cheeks  May have decreased energy to stay at breast long enough to get enough milk  Decreased milk transfer over time will result in infant weight loss and low milk supply    Feeding Plan  Hand express, as needed  Breastfeed 8-12+ times in 24 hours  Watch for:   Rhythmic sucking and swallowing during feeding  Content baby after feeding  Adequate wet & dirty diapers (per feeding log)    Supplement If infant does not latch or is sleepy at breast, end breastfeeding and feed expressed milk using the amounts below as a guideline; give more as baby cues. If necessary, make up the difference with donor milk or formula as a bridge until milk supply increases:    0-24 hours 2-10 ml each feeding  24-48 hours 5-15 ml each feeding  48-72 hours 15-30 ml each feeding  72-96 hours 30-60 ml each feeding    Pump after feedings to stimulate milk production until milk supply well established & baby breastfeeding with rhythmic sucking and swallowing (10-20 minutes), adequate output, and weight gain.    Contact Outpatient Lactation Clinic after discharge as needed.  [phone]                  2021

## 2023-09-05 NOTE — PLAN OF CARE
Rubi's VSS. Pain well controlled with scheduled tylenol and ibuprofen. Fundal assessment and bleeding WNL. Incision dressing WDL. Pumping with every feed. Up and independent. Her and  are attentive to infant.    Problem: Plan of Care - These are the overarching goals to be used throughout the patient stay.    Goal: Plan of Care Review  Description: The Plan of Care Review/Shift note should be completed every shift.  The Outcome Evaluation is a brief statement about your assessment that the patient is improving, declining, or no change.  This information will be displayed automatically on your shift note.  Outcome: Progressing  Goal: Absence of Hospital-Acquired Illness or Injury  Outcome: Progressing  Intervention: Prevent Skin Injury  Recent Flowsheet Documentation  Taken 2023 by Anna Sky RN  Body Position: position changed independently  Goal: Optimal Comfort and Wellbeing  Outcome: Progressing  Intervention: Monitor Pain and Promote Comfort  Recent Flowsheet Documentation  Taken 2023 by Anna Sky RN  Pain Management Interventions: medication (see MAR)  Intervention: Provide Person-Centered Care  Recent Flowsheet Documentation  Taken 2023 by Anna Sky RN  Trust Relationship/Rapport:   care explained   choices provided   emotional support provided   empathic listening provided   questions answered   questions encouraged   reassurance provided   thoughts/feelings acknowledged  Goal: Readiness for Transition of Care  Outcome: Progressing     Problem: Postpartum ( Delivery)  Goal: Successful Maternal Role Transition  Outcome: Progressing  Goal: Hemostasis  Outcome: Progressing  Goal: Effective Bowel Elimination  Outcome: Progressing  Goal: Absence of Infection Signs and Symptoms  Outcome: Progressing  Goal: Optimal Pain Control and Function  Outcome: Progressing  Intervention: Prevent or Manage Pain  Recent Flowsheet Documentation  Taken 2023 by  Anna Sky, RN  Pain Management Interventions: medication (see MAR)  Perineal Care:   perineum cleansed   absorbent brief/pad changed   perineal spray bottle/warm water use encouraged

## 2023-09-05 NOTE — PROGRESS NOTES
"OB Progress Note  Postop Day 3:  Delivery    Subjective:  Feels well. Pain well controlled.  No new complaints.  Has normal appetite, is tolerating general diet, is passing flatus, and has had a bowel movement. Has normal amount of lochia.  She has no emotional concerns. She is ambulating well. The patient is breastfeeding the infant with difficulty, she is pumping and has a pump at home.    Objective:  /75 (BP Location: Right arm, Patient Position: Semi-Bey's, Cuff Size: Adult Regular)   Pulse 71   Temp 98.3  F (36.8  C) (Oral)   Resp 18   Ht 1.588 m (5' 2.5\")   Wt 68.5 kg (151 lb)   LMP 2022 (Exact Date)   SpO2 97%   Breastfeeding Unknown   BMI 27.18 kg/m    GENERAL APPEARANCE: normal affect, healthy  MOOD: appropriate  ABDOMEN: soft, nontender, ffu/0   INCISION:  clean, dry, and intact  EXTREMITIES: No edema, no tenderness        Assessment:  POD #3, status post primary  section for arrest of descent and fetal intolerance to labor.  Discharge instructions reviewed with the patient and all questions answered to her satisfaction. Contraception options discussed with the patient.     Plan:    Discharge home and follow up in clinic in 6 weeks.    Paula Mehta MD 2023    "

## 2023-09-07 ENCOUNTER — IMMUNIZATION (OUTPATIENT)
Dept: FAMILY MEDICINE | Facility: CLINIC | Age: 33
End: 2023-09-07
Payer: COMMERCIAL

## 2023-09-07 PROCEDURE — 90471 IMMUNIZATION ADMIN: CPT

## 2023-09-07 PROCEDURE — 90686 IIV4 VACC NO PRSV 0.5 ML IM: CPT

## 2023-09-08 ENCOUNTER — DOCUMENTATION ONLY (OUTPATIENT)
Dept: PEDIATRICS | Facility: CLINIC | Age: 33
End: 2023-09-08
Payer: COMMERCIAL

## 2023-09-08 NOTE — PROGRESS NOTES
"[facility] Lactation Visit    Assessment:     difficulty in feeding at breast    Fetal and  jaundice     , gestational age 36 completed weeks     Josef Mehta is a 6 day old old male infant born at 36 weeks and 4 days via C/S delivery on 2023 here for lactation support.    Josef Mehta is having difficulties with feeding. Josef Mehta has lost 0.2 since their last visit 1 days ago. He is down -5% from birthweight. Main concern today is difficulty latching due to infant sleepiness and maternal breast engorgement. His weight is stable from yesterday. His main caloric intake is from bottle-feeding, but mother would like to offer breast milk as much as possible (via breastfeeding preferably). Infant was able to transfer 0.7 oz from the breast today. He required a nipple shield on the right side to sustain a longer latch. Recommended to continue to offer breast before bottles and supplement after feeds to help support growth until infant is more efficient with milk transfer. Please see feeding plan below.    Mom with breast edema. Recommended laid back breast lift to help natural drainage of edema.   Reviewed signs/symptoms of mastitis and when to seek care.     Plan:    Feeding plan: Breast-feed every 2-3 hours or more frequently based on infant cues; at least 8-12 times a day. Try breast before bottles. Use the nipple shield if needed.    When latching Joesf Mehta, make sure head, neck, and body are aligned an facing you. Support breast with \"sandwich\" hold or \"C\" hold while infant is breast-feeding. To obtain a deeper latch, aim the tip of the nipple to infant's roof of the mouth (aim for the nose). Ensure lips are flanged out. If having difficulty, wait for wide gape and gently apply downward pressure to the infant's chin. If latch is painful or lips are pursed in, unlatch Josef Mehta and reposition. Make sure to stimulate Josef Mehta to actively nurse. " Listen for swallows. If swallows are less frequent, stimulate infant by tickling his hands or feet. You may also wipe a cool wash cloth on his face or hand express your breast for milk. Also skin-to-skin and undressing Josef Mehta down to her diaper can be helpful. Burp Josef Mehta before switching sides and burp again after breast-feeding. Keep Josef Mehta in upright position for about 10-15 minutes after feeding, before laying him flat on his back.    A typical feeding is 10-15 minutes on each side; total of 20-30 minutes per breast-feeding session.    Try hand expression as baby is latched on to encourage more sucking.    Supplementation: a typical feeding volume is about 1.5-2 oz per feeding. Josef was able to transfer 0.7 oz from the breast. Offer about 1-1.5 oz of pumped milk or formula. Pace feed.    Pumping plan:  pump as needed for relief after nursing sessions if breast still feel full.      Continue to monitor output, expect at least 6 wet diapers per day and 2-4 stools in a day.  If Josef Mehta has less than the recommended wet diapers, please call us.     Josef Mehta should start Vitamin D 400 international units, once daily, when he is back to birthweight or starts gain weight.    Follow up for weight check in 1 week as scheduled.  Follow up with lactation as needed.    Maternal nipple care:   Best way to help decrease nipple soreness is to obtain a deep latch. When you pump, nipples should not touch the sides of the flange. Apply lanolin or coconut oil after breast-feeding or pumping. Wipe away left over residue before next breast-feeding or pumping. Allow nipples to air dry as much as possible to help stimulate healing. If mother is experiencing persistent breast pain, flu-like symptoms, localized breast tenderness/redness/warmness, or fevers, please contact mother's primary care provider or Obstetrician/midwife for further evaluation.    Try laid back breast lift to help with breast  swelling. Try that for about 15-20 min several times during the day.    Return to clinic sooner or call clinic sooner for any worsening symptoms (inconsolability, fever greater than 100.4F, less wet diapers, no stools, sleepiness, difficulty feeding, abdominal distention)    For further lactation concerns, please call [phone].       ____________________________________________________________________  SUBJECTIVE:     Josef Mehta is a 6 day old old male infant born at Gestational Age: 36w4d via , Low Transverse delivery on 2023 at 7:57 PM here for lactation support. This patient is accompanied in the office by his mother and father.    Concerns: mom is pumping only 1 oz on each side, but still feel firm at the breast. He is very sleepy at the breast due to jaundice. How long to try breast-feeding.    Baby is bottle-feeding. Baby gets about 2 oz of formula every 3 hours.    Baby has about 8 wet diapers and 6 stools per day. Stools are yellow/green in color.    Mom is also pumping about 8 per day and gets about 2 oz per pumping session. Mom noticed her breasts grew larger and areolas darkened during pregnancy and she noticed primary engorgement when her milk came in on day 4.    Breastfeeding Goals: hopefully give breast milk. Open to pumping/bottle-feeding. Would like breast-feed for convenience    Previous Breastfeeding Experience: first baby    Breast-surgery: none. No PCOS, hypertension, diabetes. History of Graves Disease. Mom reports this has resolved.    Maternal medications: tylenol, ibuprofen  Infant medications: has not started vitamin D supplementation      Bryce metabolic screening: no result yet.    Patient Active Problem List    Diagnosis Date Noted    Fetal and  jaundice 2023     Priority: Medium    Bryce 2023     Priority: Medium     infant of 36 completed weeks of gestation 2023     Priority: Medium    SGA (small for gestational age) 2023      Priority: Medium     Results for orders placed or performed in visit on 09/08/23   Bilirubin Direct and Total     Status: Abnormal   Result Value Ref Range    Bilirubin Direct 0.44 (H) 0.00 - 0.30 mg/dL    Bilirubin Total 15.2 (HH)   mg/dL     No current outpatient medications on file.  No past medical history on file.  No past surgical history on file.  No family history on file.    Primary care provider: Michelle Moctezuma    OBJECTIVE:    Mother:   Nipples are everted, the areola is compressible, the breast is soft and full.     Sore nipples: none   Maternal pain: with initial latching.    Maternal depression screening: Doing well    Infant:   Vitals:    09/08/23 1353   Temp: 97.5  F (36.4  C)   TempSrc: Axillary   Weight: 2.362 kg (5 lb 3.3 oz)       Average weight gain over last 1 days: -0.2 oz     Weight:   Birthweight: 5 lbs 7.48 oz  Today's weight: 5 lbs 3.3 oz    -5% from birth weight.    Amount of milk transferred from LEFT side: 0.3 oz  Amount of milk transferred from RIGHT side: 0.4 oz    Total transfer: 0.7 oz    Feeding assessment:     Infant can draw gloved finger into mouth. Infant demonstrated a coordinated suck. Infant palate is intact, tongue over gums, anterior lingual frenulum visible. No restriction with tongue movement.  Infant can hold suction with tongue while at the breast. Infant did not need frequent stimulation at the breast.     Alignment: Infant's head was aligned with its trunk. Infant did face mother. Baby was in football position today. Discussed importance of infant ventral positioning to obtain a deeper latch.     Areolar Grasp: Infant was able to open mouth wide. Infant's lips were not pursed. Infant's lips did flange outward. Tongue was visible just barely over bottom lip. Infant had complete seal.     Areolar Compression: Infant made rhythmic motion. There were no clicking or smacking sounds. There was no severe nipple discomfort.  Nipples appeared round after  "feeding.    Audible swallowing: Infant made quiet sounds of swallowing. There was an increase in frequency after milk ejection reflex.     PHYSICAL EXAM    Gen: Alert, no acute distress.   Head: Anterior and posterior fontanelles are flat and soft.   Eyes: No eye drainage. Scleral icterus.  Ears: Pinna appear well-formed. No pits.   Nose: nares patent. No nasal congestion. No nasal flaring.  Mouth: Oral mucosa moist. Tongue midline (tongue elevation adequate when crying, good lateralization). Frenulum palpable and visible. No \"heart-shaped\" tongue. Tongue able to extend pass lower gumline. Lips closed at rest.   Neck: Clavicles intact bilaterally.  Lungs: Clear to auscultation bilaterally.   Cardiac: Regular regular rate and rhythm, S1S2, no murmurs. Brachial and femoral pulses +2 and equal.  Abdomen: Soft, nontender, bowel sounds present, no hepatosplenomegaly or mass palpable. Umbilicus dry with no erythema or drainage.   : Edison stage 1 male genitalia  Skin: Intact. Dry. No rash. Moderate jaundice down to abdomen.   Musculoskeletal: equal movements of upper and lower extremities. Negative Ortolani and Ledezma maneuver.  Neuro: Appropriate muscle tone.    The visit lasted a total of 60 minutes that I spent face to face with the patient. Of that time, 60 minutes were spent on lactation. Over 50% of the time was spent counseling and educating the patient about normal  care and growth, breast-feeding, latching, milk production.    Completed by:   Abhishek Mo, APRN, CPNP, IBCLC  [facility]  [facility]  2023, 1:58 PM      "

## 2023-09-30 ENCOUNTER — HEALTH MAINTENANCE LETTER (OUTPATIENT)
Age: 33
End: 2023-09-30

## 2023-10-03 ENCOUNTER — MEDICAL CORRESPONDENCE (OUTPATIENT)
Dept: HEALTH INFORMATION MANAGEMENT | Facility: CLINIC | Age: 33
End: 2023-10-03

## 2023-10-09 ENCOUNTER — DOCUMENTATION ONLY (OUTPATIENT)
Dept: PEDIATRICS | Facility: CLINIC | Age: 33
End: 2023-10-09
Payer: COMMERCIAL

## 2023-10-10 ENCOUNTER — OFFICE VISIT (OUTPATIENT)
Dept: FAMILY MEDICINE | Facility: CLINIC | Age: 33
End: 2023-10-10
Payer: COMMERCIAL

## 2023-10-10 ENCOUNTER — MEDICAL CORRESPONDENCE (OUTPATIENT)
Dept: HEALTH INFORMATION MANAGEMENT | Facility: CLINIC | Age: 33
End: 2023-10-10

## 2023-10-10 VITALS
DIASTOLIC BLOOD PRESSURE: 67 MMHG | RESPIRATION RATE: 16 BRPM | WEIGHT: 133.8 LBS | OXYGEN SATURATION: 98 % | TEMPERATURE: 98.2 F | SYSTOLIC BLOOD PRESSURE: 108 MMHG | BODY MASS INDEX: 23.71 KG/M2 | HEIGHT: 63 IN | HEART RATE: 77 BPM

## 2023-10-10 PROBLEM — Z98.891 S/P PRIMARY LOW TRANSVERSE C-SECTION: Status: RESOLVED | Noted: 2023-09-05 | Resolved: 2023-10-10

## 2023-10-10 PROBLEM — Z36.89 ENCOUNTER FOR TRIAGE IN PREGNANT PATIENT: Status: RESOLVED | Noted: 2023-09-01 | Resolved: 2023-10-10

## 2023-10-10 PROBLEM — Z34.03 ENCOUNTER FOR SUPERVISION OF NORMAL FIRST PREGNANCY IN THIRD TRIMESTER: Status: RESOLVED | Noted: 2023-03-20 | Resolved: 2023-10-10

## 2023-10-10 PROBLEM — O26.852 SPOTTING AFFECTING PREGNANCY IN SECOND TRIMESTER: Status: RESOLVED | Noted: 2023-04-01 | Resolved: 2023-10-10

## 2023-10-10 NOTE — PROGRESS NOTES
Assessment & Plan     Routine postpartum follow-up  Patient has been doing very well overall postpartum.  She did have some postpartum baby blues and anxiety.  She is engaging in a new moms group and seeing a therapist, both of which have been very beneficial for her.  She will follow-up as needed in this regard.  Her incision site is healing really nicely.  Breast-feeding is going well.  She has met with lactation.  She would like to use condoms for contraception.  Plan follow-up in about 7 months for routine physical with Pap smear.                 Michelle Moctezuma MD  [facility]    Fela Venegas is a 33 year old, presenting for the following health issues:  Postpartum Care (6 week postpartum)        10/10/2023     3:43 PM   Additional Questions   Roomed by Qian ALONSO LPN       History of Present Illness       Reason for visit:  Postpartum    She eats 4 or more servings of fruits and vegetables daily.She consumes 0 sweetened beverage(s) daily.She exercises with enough effort to increase her heart rate 9 or less minutes per day.  She exercises with enough effort to increase her heart rate 3 or less days per week.   She is taking medications regularly.     Patient becomes a bit tearful today when talking about her postpartum journey thus far.  She ended up having a very uncomplicated pregnancy resulting in a longer induction after spontaneous rupture of membranes ultimately ending in a  section for fetal intolerance to labor.  It took her a while to come to terms with the course of her labor and delivery, but overall she is pleased with the fact that she tried everything she could to have a vaginal birth.  Overall her  section went well and she has been healing nicely.  She is happy with her scar appearance.  She saw OB for a postoperative visit and they thought she was doing well also.  Breast-feeding is going really well, she is storing some freezer supply.  She is  "now seeing a therapist and has joined a new moms group, which she thinks has been very helpful for her mood.  Her baby continues to be somewhat fussy, gassy.  He is gaining weight really well, which she is reassured by.  She does have some anxiety around illness in the  period and we talked through this today.  She and her  would like to use condoms for contraception.  Ultimately after possibly 1 more pregnancy, her  will have a vasectomy.      Review of Systems   Constitutional, HEENT, cardiovascular, pulmonary, gi and gu systems are negative, except as otherwise noted.      Objective    /67   Pulse 77   Temp 98.2  F (36.8  C) (Oral)   Resp 16   Ht 1.588 m (5' 2.5\")   Wt 60.7 kg (133 lb 12.8 oz)   LMP 2022 (Exact Date)   SpO2 98%   Breastfeeding Yes   BMI 24.08 kg/m    Body mass index is 24.08 kg/m .  Physical Exam   GENERAL: healthy, alert and no distress  RESP: Breathing comfortably, no cough during the visit  ABDOMEN: Well-healed incision with no erythema or drainage, no rectus separation palpated  MS: no gross musculoskeletal defects noted, no edema  NEURO: Normal strength and tone, mentation intact and speech normal  PSYCH: mentation appears normal, affect normal/bright    Diagnostics: EPDS = 5                  "

## 2023-11-02 ENCOUNTER — MEDICAL CORRESPONDENCE (OUTPATIENT)
Dept: HEALTH INFORMATION MANAGEMENT | Facility: CLINIC | Age: 33
End: 2023-11-02
Payer: COMMERCIAL

## 2024-03-05 ENCOUNTER — MYC MEDICAL ADVICE (OUTPATIENT)
Dept: FAMILY MEDICINE | Facility: CLINIC | Age: 34
End: 2024-03-05
Payer: COMMERCIAL

## 2024-03-05 DIAGNOSIS — Z86.39 HISTORY OF GRAVES' DISEASE: Primary | ICD-10-CM

## 2024-03-06 ENCOUNTER — LAB (OUTPATIENT)
Dept: LAB | Facility: CLINIC | Age: 34
End: 2024-03-06
Payer: COMMERCIAL

## 2024-03-06 DIAGNOSIS — Z86.39 HISTORY OF GRAVES' DISEASE: ICD-10-CM

## 2024-03-06 PROCEDURE — 36415 COLL VENOUS BLD VENIPUNCTURE: CPT

## 2024-03-06 PROCEDURE — 84443 ASSAY THYROID STIM HORMONE: CPT

## 2024-03-07 LAB — TSH SERPL DL<=0.005 MIU/L-ACNC: 2.21 UIU/ML (ref 0.3–4.2)

## 2024-03-19 ENCOUNTER — MEDICAL CORRESPONDENCE (OUTPATIENT)
Dept: HEALTH INFORMATION MANAGEMENT | Facility: CLINIC | Age: 34
End: 2024-03-19
Payer: COMMERCIAL

## 2024-04-09 ENCOUNTER — PRENATAL OFFICE VISIT (OUTPATIENT)
Dept: FAMILY MEDICINE | Facility: CLINIC | Age: 34
End: 2024-04-09
Payer: COMMERCIAL

## 2024-04-09 VITALS
HEART RATE: 86 BPM | OXYGEN SATURATION: 98 % | WEIGHT: 125.8 LBS | HEIGHT: 63 IN | RESPIRATION RATE: 16 BRPM | SYSTOLIC BLOOD PRESSURE: 114 MMHG | DIASTOLIC BLOOD PRESSURE: 72 MMHG | BODY MASS INDEX: 22.29 KG/M2

## 2024-04-09 DIAGNOSIS — N94.10 DYSPAREUNIA IN FEMALE: Primary | ICD-10-CM

## 2024-04-09 PROCEDURE — 99213 OFFICE O/P EST LOW 20 MIN: CPT | Performed by: FAMILY MEDICINE

## 2024-04-09 NOTE — PROGRESS NOTES
"  Assessment & Plan     Dyspareunia in female  Patient has tenderness to an aspect of normal-appearing hymenal ring tissue in the posterior vagina.  We discussed applying some anti-inflammatory cream to this area twice daily for 1 week to see if possibly we can calm some irritation.  Overall this is normal in appearance, no excess discharge, no mucosal abnormalities.  If no improvement with anti-inflammatory cream, could consider referral to pelvic floor physical therapy.  Patient will keep me posted.                  Subjective   Rubi is a 33 year old, presenting for the following health issues:  Vaginal Pain        2024     2:19 PM   Additional Questions   Roomed by Qian ALONSO LPN     HPI     Patient presents today for evaluation of dyspareunia.  She notes that with immediate penetration, she has some superficial pain in the posterior vagina.  This pain is present when trying to use tampons as well.  She delivered a healthy male infant via  section 7 months ago.  She did not push during labor, had failure to descend.  She started noticing pain in the vaginal area upon resumption of her menses, approximately 10 weeks postpartum.  She has pain with initial penetration, and once deep penetration is achieved, sex is pain-free.  She has trouble using tampons as she feels she can no longer insert them directly into the vagina, rather needs to change the angle of the applicator prior to insertion.  She has tried using lubrication with sex without much benefit.        Review of Systems  Constitutional, HEENT, cardiovascular, pulmonary, gi and gu systems are negative, except as otherwise noted.      Objective    /72   Pulse 86   Resp 16   Ht 1.588 m (5' 2.5\")   Wt 57.1 kg (125 lb 12.8 oz)   LMP 2024 (Exact Date)   SpO2 98%   Breastfeeding Yes   BMI 22.64 kg/m    Body mass index is 22.64 kg/m .  Physical Exam   GENERAL: alert and no distress   (female): normal female external genitalia, " normal urethral meatus, normal vaginal mucosa; tenderness over posterior vagina at the level of the hymenal ring without visible abnormality  NEURO: Normal strength and tone, mentation intact and speech normal  PSYCH: mentation appears normal, affect normal/bright    Diagnostics: None        Signed Electronically by: Michelle Moctezuma MD

## 2024-04-09 NOTE — PATIENT INSTRUCTIONS
Try an anti-inflammatory cream (hydrocortisone over-the-counter) applied to that band of hymenal tissue twice daily for 1 week.  Let me know if this offers no benefit and we could consider pelvic floor PT.

## 2024-06-04 ENCOUNTER — OFFICE VISIT (OUTPATIENT)
Dept: FAMILY MEDICINE | Facility: CLINIC | Age: 34
End: 2024-06-04
Payer: COMMERCIAL

## 2024-06-04 VITALS
OXYGEN SATURATION: 99 % | TEMPERATURE: 98 F | SYSTOLIC BLOOD PRESSURE: 111 MMHG | WEIGHT: 124.4 LBS | BODY MASS INDEX: 22.04 KG/M2 | DIASTOLIC BLOOD PRESSURE: 61 MMHG | RESPIRATION RATE: 16 BRPM | HEIGHT: 63 IN | HEART RATE: 90 BPM

## 2024-06-04 DIAGNOSIS — Z00.00 ROUTINE GENERAL MEDICAL EXAMINATION AT A HEALTH CARE FACILITY: Primary | ICD-10-CM

## 2024-06-04 DIAGNOSIS — Z86.39 HISTORY OF GRAVES' DISEASE: ICD-10-CM

## 2024-06-04 DIAGNOSIS — Z12.4 CERVICAL CANCER SCREENING: ICD-10-CM

## 2024-06-04 PROCEDURE — G0124 SCREEN C/V THIN LAYER BY MD: HCPCS | Performed by: PATHOLOGY

## 2024-06-04 PROCEDURE — 99395 PREV VISIT EST AGE 18-39: CPT | Performed by: FAMILY MEDICINE

## 2024-06-04 PROCEDURE — 87624 HPV HI-RISK TYP POOLED RSLT: CPT | Performed by: FAMILY MEDICINE

## 2024-06-04 PROCEDURE — G0145 SCR C/V CYTO,THINLAYER,RESCR: HCPCS | Performed by: FAMILY MEDICINE

## 2024-06-04 SDOH — HEALTH STABILITY: PHYSICAL HEALTH: ON AVERAGE, HOW MANY DAYS PER WEEK DO YOU ENGAGE IN MODERATE TO STRENUOUS EXERCISE (LIKE A BRISK WALK)?: 2 DAYS

## 2024-06-04 ASSESSMENT — SOCIAL DETERMINANTS OF HEALTH (SDOH): HOW OFTEN DO YOU GET TOGETHER WITH FRIENDS OR RELATIVES?: ONCE A WEEK

## 2024-06-04 NOTE — PROGRESS NOTES
Preventive Care Visit  [facility]  Michelle Moctezuma MD, Family Medicine  Jun 4, 2024      Assessment & Plan     Routine general medical examination at a health care facility  Routine history and physical, updated in EMR.  Fasting labs deferred for a future visit.  Pap smear updated today.  Immunizations are up to date.  Plan repeat physical in 1 year.    Cervical cancer screening  Routine pap smear updated today.  Exam normal.  - Pap Screen with HPV - Recommended Age 30 - 65 Years  - Gynecologic Cytology (PAP)    History of Graves' disease  Thyroid hormone level was normal 3 months ago.  Will plan to continue to check yearly at physical exams unless patient has symptoms that warrant sooner recheck.            Counseling  Appropriate preventive services were discussed with this patient, including applicable screening as appropriate for fall prevention, nutrition, physical activity, Tobacco-use cessation, weight loss and cognition.  Checklist reviewing preventive services available has been given to the patient.          Fela Venegas is a 33 year old, presenting for the following:  Physical        6/4/2024     2:36 PM   Additional Questions   Roomed by Qian ALONSO LPN   Accompanied by Spouse        Health Care Directive  Patient does not have a Health Care Directive or Living Will: Discussed advance care planning with patient; information given to patient to review.    HPI    Patient has been feeling well.  She has no particular questions or concerns today.  She is due for a routine pap smear.            6/4/2024   General Health   How would you rate your overall physical health? Good   Feel stress (tense, anxious, or unable to sleep) Not at all         6/4/2024   Nutrition   Three or more servings of calcium each day? Yes   Diet: Regular (no restrictions)   How many servings of fruit and vegetables per day? (!) 2-3   How many sweetened beverages each day? 0-1         6/4/2024    Exercise   Days per week of moderate/strenous exercise 2 days   (!) EXERCISE CONCERN      6/4/2024   Social Factors   Frequency of gathering with friends or relatives Once a week   Worry food won't last until get money to buy more No   Food not last or not have enough money for food? No   Do you have housing?  Yes   Are you worried about losing your housing? No   Lack of transportation? No   Unable to get utilities (heat,electricity)? No         6/4/2024   Dental   Dentist two times every year? Yes         6/4/2024   TB Screening   Were you born outside of the [place]? Yes         Today's PHQ-2 Score:       6/4/2024     2:27 PM   PHQ-2 ( 1999 [employer])   Q1: Little interest or pleasure in doing things 0   Q2: Feeling down, depressed or hopeless 0   PHQ-2 Score 0   Q1: Little interest or pleasure in doing things Not at all   Q2: Feeling down, depressed or hopeless Not at all   PHQ-2 Score 0           6/4/2024   Substance Use   Alcohol more than 3/day or more than 7/wk No   Do you use any other substances recreationally? No     Social History     Tobacco Use    Smoking status: Never    Smokeless tobacco: Never   Vaping Use    Vaping status: Never Used   Substance Use Topics    Alcohol use: Yes     Comment: rare    Drug use: Never             6/4/2024   Breast Cancer Screening   Family history of breast, colon, or ovarian cancer? No / Unknown      Mammogram Screening - Patient under 40 years of age: Routine Mammogram Screening not recommended.         6/4/2024   STI Screening   New sexual partner(s) since last STI/HIV test? No     History of abnormal Pap smear: No - age 30-64 HPV with reflex Pap every 5 years recommended        Latest Ref Rng & Units 5/27/2021     3:21 PM 6/11/2018     4:24 PM   PAP / HPV   PAP Negative for squamous intraepithelial lesion or malignancy. Negative for squamous intraepithelial lesion or malignancy  Electronically signed by Carol Nolasco CT (ASCP) on 6/1/2021 at  4:02 PM    Negative for  squamous intraepithelial lesion or malignancy  Electronically signed by Ramo Garcia CT (ASCP) on 2018 at 12:12 PM              2024   Contraception/Family Planning   Questions about contraception or family planning No        Reviewed and updated as needed this visit by Provider   Tobacco  Allergies  Meds  Problems  Med Hx  Surg Hx  Fam Hx  Soc   Hx Sexual Activity          Past Medical History:   Diagnosis Date    Encounter for supervision of normal first pregnancy in third trimester 2023    Hyperthyroidism     Hyperthyroidism     in [place] treated with PTU for 4 years     premature rupture of membranes in third trimester 2023    S/P primary low transverse  2023     Past Surgical History:   Procedure Laterality Date     SECTION N/A 2023    Procedure:  SECTION;  Surgeon: Celena Hodgson MD;  Location: [facility] OR    [place] TOOTH EXTRACTION       OB History    Para Term  AB Living   1 1 0 1 0 1   SAB IAB Ectopic Multiple Live Births   0 0 0 0 1      # Outcome Date GA Lbr Jj/2nd Weight Sex Type Anes PTL Lv   1  23 36w4d  2.48 kg (5 lb 7.5 oz) M CS-LTranv EPI, Nitrous, IV Y ELIZABETH      Complications: Fetal Intolerance, Failure to Progress in First Stage      Name: ALEXANDRA AVITIA      Apgar1: 8  Apgar5: 9     BP Readings from Last 3 Encounters:   24 111/61   24 114/72   10/10/23 108/67    Wt Readings from Last 3 Encounters:   24 56.4 kg (124 lb 6.4 oz)   24 57.1 kg (125 lb 12.8 oz)   10/10/23 60.7 kg (133 lb 12.8 oz)                  Patient Active Problem List   Diagnosis    History of Graves' disease    Lactating mother     Past Surgical History:   Procedure Laterality Date     SECTION N/A 2023    Procedure:  SECTION;  Surgeon: Celena Hodgson MD;  Location: [facility] OR    [place] TOOTH EXTRACTION         Social History     Tobacco Use     "Smoking status: Never    Smokeless tobacco: Never   Substance Use Topics    Alcohol use: Yes     Comment: rare     Family History   Problem Relation Age of Onset    Hypertension Mother     Diabetes Father     Colon Cancer Father 75    Diabetes Maternal Grandmother     Hypertension Maternal Grandmother     Cancer Maternal Grandfather         nasopharyngeal    Hypertension Maternal Aunt     Hypertension Maternal Uncle     Cancer Paternal Uncle         gastric    Breast Cancer No family hx of     Heart Disease No family hx of          Current Outpatient Medications   Medication Sig Dispense Refill    Prenatal Vit-Fe Fumarate-FA (PRENATAL MULTIVITAMIN W/IRON) 27-0.8 MG tablet Take 1 tablet by mouth daily       Allergies   Allergen Reactions    Propylthiouracil Hives         Review of Systems  Constitutional, HEENT, cardiovascular, pulmonary, GI, , musculoskeletal, neuro, skin, endocrine and psych systems are negative, except as otherwise noted.     Objective    Exam  /61   Pulse 90   Temp 98  F (36.7  C) (Oral)   Resp 16   Ht 1.588 m (5' 2.5\")   Wt 56.4 kg (124 lb 6.4 oz)   LMP 05/25/2024 (Approximate)   SpO2 99%   Breastfeeding Yes   BMI 22.39 kg/m     Estimated body mass index is 22.39 kg/m  as calculated from the following:    Height as of this encounter: 1.588 m (5' 2.5\").    Weight as of this encounter: 56.4 kg (124 lb 6.4 oz).    Physical Exam  GENERAL: alert and no distress  EYES: Eyes grossly normal to inspection, PERRL and conjunctivae and sclerae normal  HENT: ear canals and TM's normal, nose and mouth without ulcers or lesions  NECK: no adenopathy, no asymmetry, masses, or scars  RESP: lungs clear to auscultation - no rales, rhonchi or wheezes  BREAST: normal without masses, tenderness or nipple discharge and no palpable axillary masses or adenopathy  CV: regular rate and rhythm, normal S1 S2, no S3 or S4, no murmur, click or rub, no peripheral edema  ABDOMEN: soft, nontender, no " hepatosplenomegaly, no masses and bowel sounds normal   (female) w/bimanual: normal female external genitalia, normal urethral meatus, normal vaginal mucosa, and normal cervix/adnexa/uterus without masses or discharge  MS: no gross musculoskeletal defects noted, no edema  SKIN: no suspicious lesions or rashes  NEURO: Normal strength and tone, mentation intact and speech normal  PSYCH: mentation appears normal, affect normal/bright        Signed Electronically by: Michelle Moctezuma MD

## 2024-06-05 LAB
HPV HR 12 DNA CVX QL NAA+PROBE: NEGATIVE
HPV16 DNA CVX QL NAA+PROBE: NEGATIVE
HPV18 DNA CVX QL NAA+PROBE: NEGATIVE
HUMAN PAPILLOMA VIRUS FINAL DIAGNOSIS: NORMAL

## 2024-06-07 LAB
BKR LAB AP GYN ADEQUACY: NORMAL
BKR LAB AP GYN INTERPRETATION: NORMAL
BKR LAB AP LMP: NORMAL
BKR LAB AP PREVIOUS ABNORMAL: NORMAL
PATH REPORT.COMMENTS IMP SPEC: NORMAL
PATH REPORT.COMMENTS IMP SPEC: NORMAL
PATH REPORT.RELEVANT HX SPEC: NORMAL

## 2024-06-16 NOTE — PATIENT INSTRUCTIONS
"Patient Education   Preventive Care Advice   This is general advice we often give to help people stay healthy. Your care team may have specific advice just for you. Please talk to your care team about your own preventive care needs.  Lifestyle  Exercise at least 150 minutes each week (30 minutes a day, 5 days a week).  Do muscle strengthening activities 2 days a week. These help control your weight and prevent disease.  No smoking.  Wear sunscreen to prevent skin cancer.  Have your home tested for radon every 2 to 5 years. Radon is a colorless, odorless gas that can harm your lungs. To learn more, go to www.health.[facility].mn.us and search for \"Radon in Homes.\"  Keep guns unloaded and locked up in a safe place like a safe or gun vault, or, use a gun lock and hide the keys. Always lock away bullets separately. To learn more, visit [employer].[place].gov and search for \"safe gun storage.\"  Nutrition  Eat 5 or more servings of fruits and vegetables each day.  Try wheat bread, brown rice and whole grain pasta (instead of white bread, rice, and pasta).  Get enough calcium and vitamin D. Check the label on foods and aim for 100% of the RDA (recommended daily allowance).  Regular exams  Have a dental exam and cleaning every 6 months.  See your health care team every year to talk about:  Any changes in your health.  Any medicines your care team has prescribed.  Preventive care, family planning, and ways to prevent chronic diseases.  Shots (vaccines)   HPV shots (up to age 26), if you've never had them before.  Hepatitis B shots (up to age 59), if you've never had them before.  COVID-19 shot: Get this shot when it's due.  Flu shot: Get a flu shot every year.  Tetanus shot: Get a tetanus shot every 10 years.  Pneumococcal, hepatitis A, and RSV shots: Ask your care team if you need these based on your risk.  Shingles shot (for age 50 and up).  General health tests  Diabetes screening:  Starting at age 35, Get screened for diabetes at least " every 3 years.  If you are younger than age 35, ask your care team if you should be screened for diabetes.  Cholesterol test: At age 39, start having a cholesterol test every 5 years, or more often if advised.  Bone density scan (DEXA): At age 50, ask your care team if you should have this scan for osteoporosis (brittle bones).  Hepatitis C: Get tested at least once in your life.  Abdominal aortic aneurysm screening: Talk to your doctor about having this screening if you:  Have ever smoked; and  Are biologically male; and  Are between the ages of 65 and 75.  STIs (sexually transmitted infections)  Before age 24: Ask your care team if you should be screened for STIs.  After age 24: Get screened for STIs if you're at risk. You are at risk for STIs (including HIV) if:  You are sexually active with more than one person.  You don't use condoms every time.  You or a partner was diagnosed with a sexually transmitted infection.  If you are at risk for HIV, ask about PrEP medicine to prevent HIV.  Get tested for HIV at least once in your life, whether you are at risk for HIV or not.  Cancer screening tests  Cervical cancer screening: If you have a cervix, begin getting regular cervical cancer screening tests at age 21. Most people who have regular screenings with normal results can stop after age 65. Talk about this with your provider.  Breast cancer scan (mammogram): If you've ever had breasts, begin having regular mammograms starting at age 40. This is a scan to check for breast cancer.  Colon cancer screening: It is important to start screening for colon cancer at age 45.  Have a colonoscopy test every 10 years (or more often if you're at risk) Or, ask your provider about stool tests like a FIT test every year or Cologuard test every 3 years.  To learn more about your testing options, visit: www.[place]/401565.pdf.  For help making a decision, visit: susy/my63320.  Prostate cancer screening test: If you have a  prostate and are age 55 to 69, ask your provider if you would benefit from a yearly prostate cancer screening test.  Lung cancer screening: If you are a current or former smoker age 50 to 80, ask your care team if ongoing lung cancer screenings are right for you.  For informational purposes only. Not to replace the advice of your health care provider. Copyright   2023 [facility]. All rights reserved. Clinically reviewed by the [facility] Transitions Program. [employer] 458156 - REV 04/24.

## 2025-06-19 ENCOUNTER — RESULTS FOLLOW-UP (OUTPATIENT)
Dept: FAMILY MEDICINE | Facility: CLINIC | Age: 35
End: 2025-06-19

## 2025-06-19 ENCOUNTER — OFFICE VISIT (OUTPATIENT)
Dept: FAMILY MEDICINE | Facility: CLINIC | Age: 35
End: 2025-06-19
Attending: FAMILY MEDICINE
Payer: COMMERCIAL

## 2025-06-19 VITALS
WEIGHT: 132.4 LBS | HEART RATE: 84 BPM | RESPIRATION RATE: 16 BRPM | SYSTOLIC BLOOD PRESSURE: 98 MMHG | TEMPERATURE: 98.2 F | BODY MASS INDEX: 23.46 KG/M2 | DIASTOLIC BLOOD PRESSURE: 69 MMHG | HEIGHT: 63 IN | OXYGEN SATURATION: 100 %

## 2025-06-19 DIAGNOSIS — Z00.00 ROUTINE GENERAL MEDICAL EXAMINATION AT A HEALTH CARE FACILITY: Primary | ICD-10-CM

## 2025-06-19 DIAGNOSIS — Z86.39 HISTORY OF GRAVES' DISEASE: ICD-10-CM

## 2025-06-19 DIAGNOSIS — Z13.220 LIPID SCREENING: ICD-10-CM

## 2025-06-19 DIAGNOSIS — Z13.1 SCREENING FOR DIABETES MELLITUS: ICD-10-CM

## 2025-06-19 DIAGNOSIS — Z13.0 SCREENING FOR DEFICIENCY ANEMIA: ICD-10-CM

## 2025-06-19 LAB
CHOLEST SERPL-MCNC: 159 MG/DL
ERYTHROCYTE [DISTWIDTH] IN BLOOD BY AUTOMATED COUNT: 12.9 % (ref 10–15)
EST. AVERAGE GLUCOSE BLD GHB EST-MCNC: 111 MG/DL
FASTING STATUS PATIENT QL REPORTED: NO
HBA1C MFR BLD: 5.5 % (ref 0–5.6)
HCT VFR BLD AUTO: 40.1 % (ref 35–47)
HDLC SERPL-MCNC: 53 MG/DL
HGB BLD-MCNC: 12.9 G/DL (ref 11.7–15.7)
LDLC SERPL CALC-MCNC: 88 MG/DL
MCH RBC QN AUTO: 26.4 PG (ref 26.5–33)
MCHC RBC AUTO-ENTMCNC: 32.2 G/DL (ref 31.5–36.5)
MCV RBC AUTO: 82 FL (ref 78–100)
NONHDLC SERPL-MCNC: 106 MG/DL
PLATELET # BLD AUTO: 335 10E3/UL (ref 150–450)
RBC # BLD AUTO: 4.89 10E6/UL (ref 3.8–5.2)
TRIGL SERPL-MCNC: 92 MG/DL
TSH SERPL DL<=0.005 MIU/L-ACNC: 3.76 UIU/ML (ref 0.3–4.2)
WBC # BLD AUTO: 6.5 10E3/UL (ref 4–11)

## 2025-06-19 PROCEDURE — 84443 ASSAY THYROID STIM HORMONE: CPT | Performed by: FAMILY MEDICINE

## 2025-06-19 PROCEDURE — 3078F DIAST BP <80 MM HG: CPT | Performed by: FAMILY MEDICINE

## 2025-06-19 PROCEDURE — 3074F SYST BP LT 130 MM HG: CPT | Performed by: FAMILY MEDICINE

## 2025-06-19 PROCEDURE — 83036 HEMOGLOBIN GLYCOSYLATED A1C: CPT | Performed by: FAMILY MEDICINE

## 2025-06-19 PROCEDURE — 80061 LIPID PANEL: CPT | Performed by: FAMILY MEDICINE

## 2025-06-19 PROCEDURE — 99395 PREV VISIT EST AGE 18-39: CPT | Performed by: FAMILY MEDICINE

## 2025-06-19 PROCEDURE — 3044F HG A1C LEVEL LT 7.0%: CPT | Performed by: FAMILY MEDICINE

## 2025-06-19 PROCEDURE — 85027 COMPLETE CBC AUTOMATED: CPT | Performed by: FAMILY MEDICINE

## 2025-06-19 PROCEDURE — 36415 COLL VENOUS BLD VENIPUNCTURE: CPT | Performed by: FAMILY MEDICINE

## 2025-06-19 PROCEDURE — 3048F LDL-C <100 MG/DL: CPT | Performed by: FAMILY MEDICINE

## 2025-06-19 SDOH — HEALTH STABILITY: PHYSICAL HEALTH: ON AVERAGE, HOW MANY DAYS PER WEEK DO YOU ENGAGE IN MODERATE TO STRENUOUS EXERCISE (LIKE A BRISK WALK)?: 2 DAYS

## 2025-06-19 ASSESSMENT — SOCIAL DETERMINANTS OF HEALTH (SDOH): HOW OFTEN DO YOU GET TOGETHER WITH FRIENDS OR RELATIVES?: THREE TIMES A WEEK

## 2025-06-19 NOTE — PROGRESS NOTES
Preventive Care Visit  [facility]  Michelle Moctezuma MD, Family Medicine  Jun 19, 2025      Assessment & Plan     Routine general medical examination at a health care facility  Routine history and physical, updated in EMR.  Non-fasting labs updated as below.  Immunizations are up to date.  Pap smear is up to date also, will be due in 2029.  Plan repeat physical in 1 year.    History of Graves' disease  Screening thyroid is normal.  Patient has been feeling well.  - TSH with free T4 reflex    Lipid screening  Non-fasting lipid profile updated today and normal.  - Lipid panel reflex to direct LDL Non-fasting    Screening for deficiency anemia  Blood counts look great.  - CBC with platelets    Screening for diabetes mellitus  A1c is within normal limits.  - Hemoglobin A1c            Counseling  Appropriate preventive services were addressed with this patient via screening, questionnaire, or discussion as appropriate for fall prevention, nutrition, physical activity, Tobacco-use cessation, social engagement, weight loss and cognition.  Checklist reviewing preventive services available has been given to the patient.  Reviewed patient's diet, addressing concerns and/or questions.   She is at risk for lack of exercise and has been provided with information to increase physical activity for the benefit of her well-being.             Fela Venegas is a 34 year old, presenting for the following:  Physical        6/19/2025     9:27 AM   Additional Questions   Roomed by MICK De La Cruz    Patient has been feeling well.  She has no particular questions or concerns today.           Advance Care Planning    Discussed advance care planning with patient; informed AVS has link to [employer].        6/19/2025   General Health   How would you rate your overall physical health? Good   Feel stress (tense, anxious, or unable to sleep) Not at all         6/19/2025   Nutrition   Three or more  servings of calcium each day? Yes   Diet: Regular (no restrictions)   How many servings of fruit and vegetables per day? (!) 2-3   How many sweetened beverages each day? 0-1         6/19/2025   Exercise   Days per week of moderate/strenous exercise 2 days   (!) EXERCISE CONCERN      6/19/2025   Social Factors   Frequency of gathering with friends or relatives Three times a week   Worry food won't last until get money to buy more No   Food not last or not have enough money for food? No   Do you have housing? (Housing is defined as stable permanent housing and does not include staying outside in a car, in a tent, in an abandoned building, in an overnight shelter, or couch-surfing.) Yes   Are you worried about losing your housing? No   Lack of transportation? No   Unable to get utilities (heat,electricity)? No         6/19/2025   Dental   Dentist two times every year? Yes         Today's PHQ-2 Score:       6/19/2025     9:22 AM   PHQ-2 ( 1999 [employer])   Q1: Little interest or pleasure in doing things 0   Q2: Feeling down, depressed or hopeless 0   PHQ-2 Score 0    Q1: Little interest or pleasure in doing things Not at all   Q2: Feeling down, depressed or hopeless Not at all   PHQ-2 Score 0       Patient-reported           6/19/2025   Substance Use   Alcohol more than 3/day or more than 7/wk No   Do you use any other substances recreationally? No     Social History     Tobacco Use    Smoking status: Never    Smokeless tobacco: Never   Vaping Use    Vaping status: Never Used   Substance Use Topics    Alcohol use: Yes     Comment: rare    Drug use: Never          Mammogram Screening - Patient under 40 years of age: Routine Mammogram Screening not recommended.         6/19/2025   STI Screening   New sexual partner(s) since last STI/HIV test? No     History of abnormal Pap smear: No - age 30- 64 PAP with HPV every 5 years recommended        Latest Ref Rng & Units 6/4/2024     3:46 PM 5/27/2021     3:21 PM 6/11/2018     4:24  PM   PAP / HPV   PAP  Negative for Intraepithelial Lesion or Malignancy (NILM)  Negative for squamous intraepithelial lesion or malignancy  Electronically signed by Carol Nolasco CT (ASCP) on 2021 at  4:02 PM    Negative for squamous intraepithelial lesion or malignancy  Electronically signed by Ramo Garcia CT (ASCP) on 2018 at 12:12 PM      HPV 16 DNA Negative Negative      HPV 18 DNA Negative Negative      Other HR HPV Negative Negative              2025   Contraception/Family Planning   Questions about contraception or family planning No   What are your periods like? Regular        Reviewed and updated as needed this visit by Provider   Tobacco  Allergies  Meds  Problems  Med Hx  Surg Hx  Fam Hx  Soc   Hx Sexual Activity          Past Medical History:   Diagnosis Date    Encounter for supervision of normal first pregnancy in third trimester 2023    Hyperthyroidism     Hyperthyroidism     in [place] treated with PTU for 4 years     premature rupture of membranes in third trimester 2023    S/P primary low transverse  2023     Past Surgical History:   Procedure Laterality Date     SECTION N/A 2023    Procedure:  SECTION;  Surgeon: Celena Hodgson MD;  Location: [facility] OR    WISDOM TOOTH EXTRACTION       OB History    Para Term  AB Living   1 1 0 1 0 1   SAB IAB Ectopic Multiple Live Births   0 0 0 0 1      # Outcome Date GA Lbr Jj/2nd Weight Sex Type Anes PTL Lv   1  23 36w4d  2.48 kg (5 lb 7.5 oz) M CS-LTranv EPI, Nitrous, IV Y ELIZABETH      Complications: Fetal Intolerance, Failure to Progress in First Stage      Name: ALEXANDRA AVITIA      Apgar1: 8  Apgar5: 9     BP Readings from Last 3 Encounters:   25 98/69   24 111/61   24 114/72    Wt Readings from Last 3 Encounters:   25 60.1 kg (132 lb 6.4 oz)   24 56.4 kg (124 lb 6.4 oz)   24 57.1 kg (125  "lb 12.8 oz)                  Patient Active Problem List   Diagnosis    History of Graves' disease    Lactating mother     Past Surgical History:   Procedure Laterality Date     SECTION N/A 2023    Procedure:  SECTION;  Surgeon: Celena Hodgson MD;  Location: [facility] OR    WISDOM TOOTH EXTRACTION         Social History     Tobacco Use    Smoking status: Never    Smokeless tobacco: Never   Substance Use Topics    Alcohol use: Yes     Comment: rare     Family History   Problem Relation Age of Onset    Hypertension Mother     Diabetes Father     Colon Cancer Father 75    Diabetes Maternal Grandmother     Hypertension Maternal Grandmother     Cancer Maternal Grandfather         nasopharyngeal    Hypertension Maternal Aunt     Hypertension Maternal Uncle     Cancer Paternal Uncle         gastric    Breast Cancer No family hx of     Heart Disease No family hx of          Current Outpatient Medications   Medication Sig Dispense Refill    Prenatal Vit-Fe Fumarate-FA (PRENATAL MULTIVITAMIN W/IRON) 27-0.8 MG tablet Take 1 tablet by mouth daily (Patient not taking: Reported on 2025)       Allergies   Allergen Reactions    Propylthiouracil Hives         Review of Systems  Constitutional, HEENT, cardiovascular, pulmonary, GI, , musculoskeletal, neuro, skin, endocrine and psych systems are negative, except as otherwise noted.     Objective    Exam  BP 98/69   Pulse 84   Temp 98.2  F (36.8  C) (Oral)   Resp 16   Ht 1.588 m (5' 2.5\")   Wt 60.1 kg (132 lb 6.4 oz)   LMP 2025 (Approximate)   SpO2 100%   BMI 23.83 kg/m     Estimated body mass index is 23.83 kg/m  as calculated from the following:    Height as of this encounter: 1.588 m (5' 2.5\").    Weight as of this encounter: 60.1 kg (132 lb 6.4 oz).    Physical Exam  GENERAL: alert and no distress  EYES: Eyes grossly normal to inspection, PERRL and conjunctivae and sclerae normal  HENT: ear canals and TM's normal, nose and mouth " without ulcers or lesions  NECK: no adenopathy, no asymmetry, masses, or scars  RESP: lungs clear to auscultation - no rales, rhonchi or wheezes  BREAST: normal without masses, tenderness or nipple discharge and no palpable axillary masses or adenopathy  CV: regular rate and rhythm, normal S1 S2, no S3 or S4, no murmur, click or rub, no peripheral edema  ABDOMEN: soft, nontender, no hepatosplenomegaly, no masses and bowel sounds normal  MS: no gross musculoskeletal defects noted, no edema  SKIN: no suspicious lesions or rashes  NEURO: Normal strength and tone, mentation intact and speech normal  PSYCH: mentation appears normal, affect normal/bright        Signed Electronically by: Michelle Mocteuzma MD

## 2025-06-19 NOTE — PATIENT INSTRUCTIONS
Patient Education   Preventive Care Advice   This is general advice given by our system to help you stay healthy. However, your care team may have specific advice just for you. Please talk to your care team about your preventive care needs.  Nutrition  Eat 5 or more servings of fruits and vegetables each day.  Try wheat bread, brown rice and whole grain pasta (instead of white bread, rice, and pasta).  Get enough calcium and vitamin D. Check the label on foods and aim for 100% of the RDA (recommended daily allowance).  Lifestyle  Exercise at least 150 minutes each week  (30 minutes a day, 5 days a week).  Do muscle strengthening activities 2 days a week. These help control your weight and prevent disease.  No smoking.  Wear sunscreen to prevent skin cancer.  Have a dental exam and cleaning every 6 months.  Yearly exams  See your health care team every year to talk about:  Any changes in your health.  Any medicines your care team has prescribed.  Preventive care, family planning, and ways to prevent chronic diseases.  Shots (vaccines)   HPV shots (up to age 26), if you've never had them before.  Hepatitis B shots (up to age 59), if you've never had them before.  COVID-19 shot: Get this shot when it's due.  Flu shot: Get a flu shot every year.  Tetanus shot: Get a tetanus shot every 10 years.  Pneumococcal, hepatitis A, and RSV shots: Ask your care team if you need these based on your risk.  Shingles shot (for age 50 and up)  General health tests  Diabetes screening:  Starting at age 35, Get screened for diabetes at least every 3 years.  If you are younger than age 35, ask your care team if you should be screened for diabetes.  Cholesterol test: At age 39, start having a cholesterol test every 5 years, or more often if advised.  Bone density scan (DEXA): At age 50, ask your care team if you should have this scan for osteoporosis (brittle bones).  Hepatitis C: Get tested at least once in your life.  STIs (sexually  transmitted infections)  Before age 24: Ask your care team if you should be screened for STIs.  After age 24: Get screened for STIs if you're at risk. You are at risk for STIs (including HIV) if:  You are sexually active with more than one person.  You don't use condoms every time.  You or a partner was diagnosed with a sexually transmitted infection.  If you are at risk for HIV, ask about PrEP medicine to prevent HIV.  Get tested for HIV at least once in your life, whether you are at risk for HIV or not.  Cancer screening tests  Cervical cancer screening: If you have a cervix, begin getting regular cervical cancer screening tests starting at age 21.  Breast cancer scan (mammogram): If you've ever had breasts, begin having regular mammograms starting at age 40. This is a scan to check for breast cancer.  Colon cancer screening: It is important to start screening for colon cancer at age 45.  Have a colonoscopy test every 10 years (or more often if you're at risk) Or, ask your provider about stool tests like a FIT test every year or Cologuard test every 3 years.  To learn more about your testing options, visit:   .  For help making a decision, visit:   https://bit.ly/yo43198.  Prostate cancer screening test: If you have a prostate, ask your care team if a prostate cancer screening test (PSA) at age 55 is right for you.  Lung cancer screening: If you are a current or former smoker ages 50 to 80, ask your care team if ongoing lung cancer screenings are right for you.  For informational purposes only. Not to replace the advice of your health care provider. Copyright   2023 [employer]. All rights reserved. Clinically reviewed by the [facility] Transitions Program. [employer] [id number] - REV 01/24.

## 2025-07-26 ENCOUNTER — MYC MEDICAL ADVICE (OUTPATIENT)
Dept: FAMILY MEDICINE | Facility: CLINIC | Age: 35
End: 2025-07-26
Payer: COMMERCIAL

## 2025-07-26 DIAGNOSIS — Z30.430 ENCOUNTER FOR IUD INSERTION: Primary | ICD-10-CM

## 2025-07-29 RX ORDER — MISOPROSTOL 200 UG/1
TABLET ORAL
Qty: 2 TABLET | Refills: 0 | Status: SHIPPED | OUTPATIENT
Start: 2025-07-29

## 2025-08-11 ENCOUNTER — E-VISIT (OUTPATIENT)
Dept: FAMILY MEDICINE | Facility: CLINIC | Age: 35
End: 2025-08-11
Payer: COMMERCIAL

## 2025-08-11 DIAGNOSIS — Z11.3 SCREENING EXAMINATION FOR VENEREAL DISEASE: Primary | ICD-10-CM

## 2025-08-11 DIAGNOSIS — Z11.3 SCREENING EXAMINATION FOR STI: ICD-10-CM

## 2025-08-12 ENCOUNTER — LAB (OUTPATIENT)
Dept: LAB | Facility: CLINIC | Age: 35
End: 2025-08-12
Payer: COMMERCIAL

## 2025-08-12 DIAGNOSIS — Z11.3 SCREENING EXAMINATION FOR VENEREAL DISEASE: ICD-10-CM

## 2025-08-12 DIAGNOSIS — Z11.3 SCREENING EXAMINATION FOR STI: ICD-10-CM

## 2025-08-12 LAB
CLUE CELLS: ABNORMAL
HCV AB SERPL QL IA: NONREACTIVE
HIV 1+2 AB+HIV1 P24 AG SERPL QL IA: NONREACTIVE
HSV1 IGG SERPL QL IA: 0.12 INDEX
HSV1 IGG SERPL QL IA: NORMAL
HSV2 IGG SERPL QL IA: 0.09 INDEX
HSV2 IGG SERPL QL IA: NORMAL
T PALLIDUM AB SER QL: NONREACTIVE
TRICHOMONAS, WET PREP: ABNORMAL
WBC'S/HIGH POWER FIELD, WET PREP: ABNORMAL
YEAST, WET PREP: ABNORMAL

## 2025-08-12 PROCEDURE — 87389 HIV-1 AG W/HIV-1&-2 AB AG IA: CPT

## 2025-08-12 PROCEDURE — 87491 CHLMYD TRACH DNA AMP PROBE: CPT

## 2025-08-12 PROCEDURE — 86780 TREPONEMA PALLIDUM: CPT

## 2025-08-12 PROCEDURE — 36415 COLL VENOUS BLD VENIPUNCTURE: CPT

## 2025-08-12 PROCEDURE — 87591 N.GONORRHOEAE DNA AMP PROB: CPT

## 2025-08-12 PROCEDURE — 86803 HEPATITIS C AB TEST: CPT

## 2025-08-12 PROCEDURE — 86696 HERPES SIMPLEX TYPE 2 TEST: CPT

## 2025-08-12 PROCEDURE — 87210 SMEAR WET MOUNT SALINE/INK: CPT

## 2025-08-12 PROCEDURE — 86695 HERPES SIMPLEX TYPE 1 TEST: CPT

## 2025-08-13 LAB
C TRACH DNA SPEC QL PROBE+SIG AMP: NEGATIVE
N GONORRHOEA DNA SPEC QL NAA+PROBE: NEGATIVE
SPECIMEN TYPE: NORMAL

## 2025-08-19 ENCOUNTER — E-VISIT (OUTPATIENT)
Dept: FAMILY MEDICINE | Facility: CLINIC | Age: 35
End: 2025-08-19
Payer: COMMERCIAL

## 2025-08-19 ENCOUNTER — LAB (OUTPATIENT)
Dept: LAB | Facility: CLINIC | Age: 35
End: 2025-08-19
Payer: COMMERCIAL

## 2025-08-19 DIAGNOSIS — N39.0 ACUTE UTI (URINARY TRACT INFECTION): ICD-10-CM

## 2025-08-19 DIAGNOSIS — R30.0 DYSURIA: ICD-10-CM

## 2025-08-19 DIAGNOSIS — R30.0 DYSURIA: Primary | ICD-10-CM

## 2025-08-19 LAB
ALBUMIN UR-MCNC: 30 MG/DL
APPEARANCE UR: ABNORMAL
BACTERIA #/AREA URNS HPF: ABNORMAL /HPF
BILIRUB UR QL STRIP: NEGATIVE
COLOR UR AUTO: ABNORMAL
GLUCOSE UR STRIP-MCNC: NEGATIVE MG/DL
HGB UR QL STRIP: ABNORMAL
KETONES UR STRIP-MCNC: NEGATIVE MG/DL
LEUKOCYTE ESTERASE UR QL STRIP: ABNORMAL
NITRATE UR QL: NEGATIVE
PH UR STRIP: 7.5 [PH] (ref 5–7)
RBC #/AREA URNS AUTO: ABNORMAL /HPF
SP GR UR STRIP: 1.01 (ref 1–1.03)
SQUAMOUS #/AREA URNS AUTO: ABNORMAL /LPF
UROBILINOGEN UR STRIP-ACNC: 0.2 E.U./DL
WBC #/AREA URNS AUTO: ABNORMAL /HPF
WBC CLUMPS #/AREA URNS HPF: PRESENT /HPF

## 2025-08-19 PROCEDURE — 87186 SC STD MICRODIL/AGAR DIL: CPT

## 2025-08-19 PROCEDURE — 81001 URINALYSIS AUTO W/SCOPE: CPT

## 2025-08-19 PROCEDURE — 87086 URINE CULTURE/COLONY COUNT: CPT

## 2025-08-19 RX ORDER — NITROFURANTOIN 25; 75 MG/1; MG/1
100 CAPSULE ORAL 2 TIMES DAILY
Qty: 10 CAPSULE | Refills: 0 | Status: SHIPPED | OUTPATIENT
Start: 2025-08-19 | End: 2025-08-24

## 2025-08-20 LAB — BACTERIA UR CULT: ABNORMAL

## (undated) DEVICE — SU VICRYL 4-0 KS 27" UND J662H

## (undated) DEVICE — SU MONOCRYL 0 CT-1 36" UND Y946H

## (undated) DEVICE — PACK MINOR SINGLE BASIN SSK3001

## (undated) DEVICE — DRSG PRIMAPORE 04X11 3/4"

## (undated) DEVICE — SURGICEL POWDER ABSORBABLE HEMOSTAT 3GM 3013SP

## (undated) DEVICE — SU MONOCRYL+ 4-0 18IN PS2 UND MCP496G

## (undated) DEVICE — GOWN IMPERVIOUS BREATHABLE SMART LG 89015

## (undated) DEVICE — SCALPEL DISP SAFETY #10

## (undated) DEVICE — CUSTOM PACK C-SECTION LHE

## (undated) DEVICE — GLOVE UNDER INDICATOR PI SZ 6.5 LF 41665

## (undated) DEVICE — GLOVE BIOGEL PI ULTRATOUCH G SZ 6.5 42165

## (undated) DEVICE — PLATE GROUNDING ADULT W/CORD 9165L

## (undated) DEVICE — PREP CHLORAPREP 26ML TINTED HI-LITE ORANGE 930815

## (undated) DEVICE — SUTURE MONOCRYL+ 0 CT-1 UND 18 MCP946H

## (undated) DEVICE — SUTURE VICRYL+ 0 36IN CT-1 UND VCP946H

## (undated) RX ORDER — MORPHINE SULFATE 1 MG/ML
INJECTION, SOLUTION EPIDURAL; INTRATHECAL; INTRAVENOUS
Status: DISPENSED
Start: 2023-09-02